# Patient Record
Sex: MALE | Race: WHITE | NOT HISPANIC OR LATINO | Employment: UNEMPLOYED | URBAN - METROPOLITAN AREA
[De-identification: names, ages, dates, MRNs, and addresses within clinical notes are randomized per-mention and may not be internally consistent; named-entity substitution may affect disease eponyms.]

---

## 2021-12-20 ENCOUNTER — OFFICE VISIT (OUTPATIENT)
Dept: INTERNAL MEDICINE | Facility: CLINIC | Age: 36
End: 2021-12-20

## 2021-12-20 VITALS
DIASTOLIC BLOOD PRESSURE: 78 MMHG | BODY MASS INDEX: 31.69 KG/M2 | WEIGHT: 226.4 LBS | TEMPERATURE: 97.8 F | HEART RATE: 97 BPM | SYSTOLIC BLOOD PRESSURE: 102 MMHG | HEIGHT: 71 IN

## 2021-12-20 DIAGNOSIS — F32.A ANXIETY AND DEPRESSION: ICD-10-CM

## 2021-12-20 DIAGNOSIS — Z13.220 LIPID SCREENING: Primary | ICD-10-CM

## 2021-12-20 DIAGNOSIS — F41.9 ANXIETY AND DEPRESSION: ICD-10-CM

## 2021-12-20 DIAGNOSIS — F90.0 ADHD, PREDOMINANTLY INATTENTIVE TYPE: ICD-10-CM

## 2021-12-20 DIAGNOSIS — F10.21 HISTORY OF ALCOHOLISM (HCC): ICD-10-CM

## 2021-12-20 DIAGNOSIS — Z13.29 THYROID DISORDER SCREENING: ICD-10-CM

## 2021-12-20 DIAGNOSIS — Z13.21 ENCOUNTER FOR VITAMIN DEFICIENCY SCREENING: ICD-10-CM

## 2021-12-20 DIAGNOSIS — R74.8 ELEVATED LIVER ENZYMES: ICD-10-CM

## 2021-12-20 DIAGNOSIS — F51.01 PRIMARY INSOMNIA: ICD-10-CM

## 2021-12-20 PROCEDURE — 99204 OFFICE O/P NEW MOD 45 MIN: CPT | Performed by: PHYSICIAN ASSISTANT

## 2021-12-20 NOTE — PROGRESS NOTES
Patient Care Team:  Maria Elena Chawla PA-C as PCP - General (Physician Assistant)  Chelo Wen APRN    Chief Complaint:: No chief complaint on file.       Subjective     HPI  Diego is a 36 year old male who presents to [facility].  Born and raised in [place].  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  Last job was [employer].  He is currently unemployed.  He is  to nurse practitioner working in women's health.  Mother and Father living-HTN, early 70s  Uncle-stroke  Sister-depression/anxiety  Pfizer vaccinations x 2-will bring card at next appointment.    Has struggled with anxiety/depression since he was 18 or 19 years old.  Reports increase in depression the past for 5 years.  Was seeing nurse practitioner Wilma More for medication management.  Now seeing Dr. Richter.    Has had increase in drinking over the past 2-3 years. Started right before COVID, and then worsened during the pandemic.  He ended up in the ED ([place]) [place] 6 weeks ago and this was his wake up call. He has been sober every since. Has lost 15-16 pounds since he stopped drinking.  He has been exercising regularly.  He has tried to eat healthy.   He does not go to AA meetings, stays with parents when his wife is at work.    Diagnosed with ADHD inattentive type.  Currently taking Adderall XL 15 mg in the morning and Adderall 5 mg in the afternoon.  He reports this helps him with focus.    Prescribed trazodone 50 mg for sleep.  However, he rarely takes this.  Currently taking Lamictal 100 mg nightly, but psychiatrist might wean him off of this.  He has recently switched from Viibryd to Pristiq approximately 4 weeks ago.  He reports improvement of symptoms.    He denies chest pain, shortness of breath, palpitations, or headaches.      The  "following portions of the patient's history were reviewed and updated as appropriate: active problem list, medication list, allergies, family history, social history    Review of Systems:   Review of Systems   Constitutional: Negative for activity change, appetite change, diaphoresis, fatigue, unexpected weight gain and unexpected weight loss.   HENT: Negative for congestion, dental problem, drooling, ear discharge, ear pain, hearing loss, rhinorrhea, sinus pressure, sore throat, swollen glands and voice change.    Eyes: Negative for blurred vision, double vision and visual disturbance.   Respiratory: Negative for chest tightness and shortness of breath.    Cardiovascular: Negative for chest pain, palpitations and leg swelling.   Gastrointestinal: Negative for abdominal pain, blood in stool, GERD and indigestion.   Endocrine: Negative for cold intolerance and heat intolerance.   Genitourinary: Negative for dysuria and hematuria.   Musculoskeletal: Negative for arthralgias, back pain and myalgias.   Skin: Negative for skin lesions.   Allergic/Immunologic: Negative for environmental allergies and food allergies.   Neurological: Negative for tremors, seizures, syncope, speech difficulty, weakness, headache, memory problem and confusion.   Hematological: Does not bruise/bleed easily.   Psychiatric/Behavioral: Positive for decreased concentration, sleep disturbance, depressed mood and stress. The patient is not nervous/anxious.        Vital Signs  Vitals:    12/20/21 1347   BP: 102/78   BP Location: Left arm   Patient Position: Sitting   Cuff Size: Adult   Pulse: 97   Temp: 97.8 °F (36.6 °C)   TempSrc: Temporal   Weight: 103 kg (226 lb 6.4 oz)   Height: 181 cm (71.26\")   PainSc: 0-No pain     Body mass index is 31.35 kg/m².    Labs  No visits with results within 3 Month(s) from this visit.   Latest known visit with results is:   No results found for any previous visit.       Imaging  No radiology results for the last 30 " days.      Current Outpatient Medications:   •  amphetamine-dextroamphetamine (ADDERALL) 5 MG tablet, Take 1 tablet by mouth Daily at noon., Disp: 30 tablet, Rfl: 0  •  desvenlafaxine (PRISTIQ) 100 MG 24 hr tablet, Take 1 tablet by mouth Every Morning., Disp: 30 tablet, Rfl: 1  •  diazePAM (VALIUM) 5 MG tablet, Take 1.5 tablets by mouth Every Evening., Disp: 45 tablet, Rfl: 0  •  lamoTRIgine (LaMICtal) 100 MG tablet, Take 1 tablet by mouth Every Morning., Disp: 90 tablet, Rfl: 0  •  propranolol (INDERAL) 10 MG tablet, Take 1-2 tablets by mouth 3 (Three) Times a Day. (Patient taking differently: Take 10-20 mg by mouth Every 30 (Thirty) Days.), Disp: 540 tablet, Rfl: 2  •  traZODone (DESYREL) 50 MG tablet, Take 0.5-1 tablets by mouth every night at bedtime., Disp: 90 tablet, Rfl: 0    Physical Exam:    Physical Exam  Vitals reviewed.   Constitutional:       Appearance: Normal appearance. He is well-developed and normal weight.   HENT:      Head: Normocephalic and atraumatic.      Right Ear: Hearing, tympanic membrane, ear canal and external ear normal.      Left Ear: Hearing, tympanic membrane, ear canal and external ear normal.      Nose: Nose normal.      Mouth/Throat:      Mouth: Mucous membranes are moist.      Pharynx: Oropharynx is clear. Uvula midline.   Eyes:      General: Lids are normal.      Conjunctiva/sclera: Conjunctivae normal.      Pupils: Pupils are equal, round, and reactive to light.   Cardiovascular:      Rate and Rhythm: Normal rate and regular rhythm.      Pulses: Normal pulses.      Heart sounds: Normal heart sounds.   Pulmonary:      Effort: Pulmonary effort is normal.      Breath sounds: Normal breath sounds.   Abdominal:      General: Abdomen is flat. Bowel sounds are normal.      Palpations: Abdomen is soft.      Tenderness: There is no right CVA tenderness or left CVA tenderness.   Musculoskeletal:         General: Normal range of motion.      Cervical back: Full passive range of motion  without pain, normal range of motion and neck supple.   Skin:     General: Skin is warm and dry.   Neurological:      General: No focal deficit present.      Mental Status: He is alert and oriented to person, place, and time. Mental status is at baseline.      Deep Tendon Reflexes: Reflexes are normal and symmetric.   Psychiatric:         Mood and Affect: Mood normal.         Speech: Speech normal.         Behavior: Behavior normal.         Thought Content: Thought content normal.         Judgment: Judgment normal.         Procedures        Assessment/Plan   Problem List Items Addressed This Visit        Cardiac and Vasculature    Lipid screening - Primary    Relevant Orders    Lipid Panel       Endocrine and Metabolic    Thyroid disorder screening    Relevant Orders    TSH    T4, Free    T3, Free    Encounter for vitamin deficiency screening    Relevant Orders    Vitamin D 25 Hydroxy    Vitamin B12       Gastrointestinal Abdominal     Elevated liver enzymes    Overview     Elevated liver enzymes noted while heavy drinking.  Check labs today.         Relevant Orders    CBC & Differential    Comprehensive Metabolic Panel       Mental Health    History of alcoholism (HCC)    Overview     Was treated in [place] emergency department approximately 6 weeks ago.  Will request for records.  Has been sober ever since.  Has support with parents and spouse.  He reports he will pursue AA meetings after January.           Relevant Orders    Folate    ADHD, predominantly inattentive type    Overview     Now managed by Dr. Richter.  Currently taking Adderall XL 15 mg every morning and Adderall 5 mg in the afternoon.         Relevant Medications    traZODone (DESYREL) 50 MG tablet    desvenlafaxine (PRISTIQ) 100 MG 24 hr tablet    diazePAM (VALIUM) 5 MG tablet    amphetamine-dextroamphetamine (ADDERALL) 5 MG tablet    Anxiety and depression    Overview     Currently treated with desvenlafaxine 100 mg tablets daily.  Continue  Valium 1-1/2 pills daily.  Continue lamotrigine 100 mg tablets at night.  He rarely takes propranolol 10 mg tablets for anxiety.         Relevant Medications    traZODone (DESYREL) 50 MG tablet    desvenlafaxine (PRISTIQ) 100 MG 24 hr tablet    diazePAM (VALIUM) 5 MG tablet    amphetamine-dextroamphetamine (ADDERALL) 5 MG tablet       Sleep    Primary insomnia    Overview     Previous prescription of trazodone 50 mg tablets.  He reports using these very rarely, approximately once a month.               Return in about 6 months (around 6/20/2022) for RECHECK 30MIN, ROUTINE PHYSICAL.    Plan of care reviewed with patient at the conclusion of today's visit. Education was provided regarding diagnosis, management, and any prescribed or recommended OTC medications.Patient verbalizes understanding of and agreement with management plan.     I spent 36 minutes caring for Diego on this date of service. This time includes time spent by me in the following activities:preparing for the visit, reviewing tests, obtaining and/or reviewing a separately obtained history, performing a medically appropriate examination and/or evaluation , counseling and educating the patient/family/caregiver, ordering medications, tests, or procedures, referring and communicating with other health care professionals  and documenting information in the medical record    Maria Elena Chawla PA-C    Please note that portions of this note were completed with a voice recognition program.

## 2021-12-29 ENCOUNTER — PATIENT ROUNDING (BHMG ONLY) (OUTPATIENT)
Dept: INTERNAL MEDICINE | Facility: CLINIC | Age: 36
End: 2021-12-29

## 2021-12-29 NOTE — PROGRESS NOTES
Left a voicemail with the following message:    My name is Rosana Catherine & I’m the practice manager here at [facility] with Maria Elena Chawla's office.    I’m calling as I see you were a new patient with our practice last week & I wanted to officially welcome you & make sure everything went smoothly for you.    If you have any questions, comments or concerns please feel free to reach out to me.    Otherwise again welcome & we look forward to seeing you back in about 6 months for your physical.  If you need anything in the meantime just let us know.    I hope you have a Happy new year.

## 2022-06-16 ENCOUNTER — TELEPHONE (OUTPATIENT)
Dept: INTERNAL MEDICINE | Facility: CLINIC | Age: 37
End: 2022-06-16

## 2022-06-16 NOTE — TELEPHONE ENCOUNTER
Caller: Diego Lloyd    Relationship: Self    Best call back number: [phone]    What orders are you requesting (i.e. lab or imaging): BLOOD WORK     In what timeframe would the patient need to come in: N/A    Where will you receive your lab/imaging services: N/A    Additional notes: PATIENT STATED THAT HE WOULD LIKE TO HAVE HIS BLOOD WORK PRIOR TO APPOINTMENT ON 06/21/22    PLEASE ADVISE

## 2022-06-20 DIAGNOSIS — Z13.29 THYROID DISORDER SCREENING: Primary | ICD-10-CM

## 2022-06-20 DIAGNOSIS — Z13.220 LIPID SCREENING: ICD-10-CM

## 2022-06-20 DIAGNOSIS — Z13.21 ENCOUNTER FOR VITAMIN DEFICIENCY SCREENING: ICD-10-CM

## 2022-06-20 DIAGNOSIS — R74.8 ELEVATED LIVER ENZYMES: ICD-10-CM

## 2022-06-21 ENCOUNTER — LAB (OUTPATIENT)
Dept: LAB | Facility: HOSPITAL | Age: 37
End: 2022-06-21

## 2022-06-21 ENCOUNTER — OFFICE VISIT (OUTPATIENT)
Dept: INTERNAL MEDICINE | Facility: CLINIC | Age: 37
End: 2022-06-21

## 2022-06-21 VITALS
BODY MASS INDEX: 29.76 KG/M2 | WEIGHT: 212.6 LBS | SYSTOLIC BLOOD PRESSURE: 106 MMHG | HEART RATE: 88 BPM | HEIGHT: 71 IN | DIASTOLIC BLOOD PRESSURE: 80 MMHG | TEMPERATURE: 97.8 F

## 2022-06-21 DIAGNOSIS — F51.01 PRIMARY INSOMNIA: ICD-10-CM

## 2022-06-21 DIAGNOSIS — H61.22 HEARING LOSS OF LEFT EAR DUE TO CERUMEN IMPACTION: ICD-10-CM

## 2022-06-21 DIAGNOSIS — F90.0 ADHD, PREDOMINANTLY INATTENTIVE TYPE: ICD-10-CM

## 2022-06-21 DIAGNOSIS — F41.9 ANXIETY AND DEPRESSION: ICD-10-CM

## 2022-06-21 DIAGNOSIS — Z13.220 LIPID SCREENING: ICD-10-CM

## 2022-06-21 DIAGNOSIS — F10.21 HISTORY OF ALCOHOLISM: ICD-10-CM

## 2022-06-21 DIAGNOSIS — F32.A ANXIETY AND DEPRESSION: ICD-10-CM

## 2022-06-21 DIAGNOSIS — R74.8 ELEVATED LIVER ENZYMES: ICD-10-CM

## 2022-06-21 DIAGNOSIS — Z13.29 THYROID DISORDER SCREENING: ICD-10-CM

## 2022-06-21 DIAGNOSIS — Z00.00 ROUTINE MEDICAL EXAM: Primary | ICD-10-CM

## 2022-06-21 DIAGNOSIS — Z13.21 ENCOUNTER FOR VITAMIN DEFICIENCY SCREENING: ICD-10-CM

## 2022-06-21 LAB
25(OH)D3 SERPL-MCNC: 30.6 NG/ML (ref 30–100)
ALBUMIN SERPL-MCNC: 4.7 G/DL (ref 3.5–5.2)
ALBUMIN/GLOB SERPL: 1.9 G/DL
ALP SERPL-CCNC: 62 U/L (ref 39–117)
ALT SERPL W P-5'-P-CCNC: 19 U/L (ref 1–41)
ANION GAP SERPL CALCULATED.3IONS-SCNC: 17.1 MMOL/L (ref 5–15)
AST SERPL-CCNC: 20 U/L (ref 1–40)
BASOPHILS # BLD AUTO: 0.06 10*3/MM3 (ref 0–0.2)
BASOPHILS NFR BLD AUTO: 0.8 % (ref 0–1.5)
BILIRUB SERPL-MCNC: 0.3 MG/DL (ref 0–1.2)
BUN SERPL-MCNC: 3 MG/DL (ref 6–20)
BUN/CREAT SERPL: 3.3 (ref 7–25)
CALCIUM SPEC-SCNC: 9.8 MG/DL (ref 8.6–10.5)
CHLORIDE SERPL-SCNC: 97 MMOL/L (ref 98–107)
CHOLEST SERPL-MCNC: 140 MG/DL (ref 0–200)
CO2 SERPL-SCNC: 24.9 MMOL/L (ref 22–29)
CREAT SERPL-MCNC: 0.92 MG/DL (ref 0.76–1.27)
DEPRECATED RDW RBC AUTO: 38.6 FL (ref 37–54)
EGFRCR SERPLBLD CKD-EPI 2021: 110.6 ML/MIN/1.73
EOSINOPHIL # BLD AUTO: 0.07 10*3/MM3 (ref 0–0.4)
EOSINOPHIL NFR BLD AUTO: 0.9 % (ref 0.3–6.2)
ERYTHROCYTE [DISTWIDTH] IN BLOOD BY AUTOMATED COUNT: 11.4 % (ref 12.3–15.4)
GLOBULIN UR ELPH-MCNC: 2.5 GM/DL
GLUCOSE SERPL-MCNC: 75 MG/DL (ref 65–99)
HCT VFR BLD AUTO: 44.6 % (ref 37.5–51)
HDLC SERPL-MCNC: 43 MG/DL (ref 40–60)
HGB BLD-MCNC: 15.3 G/DL (ref 13–17.7)
IMM GRANULOCYTES # BLD AUTO: 0.05 10*3/MM3 (ref 0–0.05)
IMM GRANULOCYTES NFR BLD AUTO: 0.7 % (ref 0–0.5)
LDLC SERPL CALC-MCNC: 75 MG/DL (ref 0–100)
LDLC/HDLC SERPL: 1.67 {RATIO}
LYMPHOCYTES # BLD AUTO: 1.98 10*3/MM3 (ref 0.7–3.1)
LYMPHOCYTES NFR BLD AUTO: 26.7 % (ref 19.6–45.3)
MCH RBC QN AUTO: 32.1 PG (ref 26.6–33)
MCHC RBC AUTO-ENTMCNC: 34.3 G/DL (ref 31.5–35.7)
MCV RBC AUTO: 93.7 FL (ref 79–97)
MONOCYTES # BLD AUTO: 0.58 10*3/MM3 (ref 0.1–0.9)
MONOCYTES NFR BLD AUTO: 7.8 % (ref 5–12)
NEUTROPHILS NFR BLD AUTO: 4.67 10*3/MM3 (ref 1.7–7)
NEUTROPHILS NFR BLD AUTO: 63.1 % (ref 42.7–76)
NRBC BLD AUTO-RTO: 0 /100 WBC (ref 0–0.2)
PLATELET # BLD AUTO: 337 10*3/MM3 (ref 140–450)
PMV BLD AUTO: 9.7 FL (ref 6–12)
POTASSIUM SERPL-SCNC: 4.3 MMOL/L (ref 3.5–5.2)
PROT SERPL-MCNC: 7.2 G/DL (ref 6–8.5)
RBC # BLD AUTO: 4.76 10*6/MM3 (ref 4.14–5.8)
SODIUM SERPL-SCNC: 139 MMOL/L (ref 136–145)
T4 FREE SERPL-MCNC: 0.94 NG/DL (ref 0.93–1.7)
TRIGL SERPL-MCNC: 125 MG/DL (ref 0–150)
TSH SERPL DL<=0.05 MIU/L-ACNC: 2.32 UIU/ML (ref 0.27–4.2)
VIT B12 BLD-MCNC: 386 PG/ML (ref 211–946)
VLDLC SERPL-MCNC: 22 MG/DL (ref 5–40)
WBC NRBC COR # BLD: 7.41 10*3/MM3 (ref 3.4–10.8)

## 2022-06-21 PROCEDURE — 82306 VITAMIN D 25 HYDROXY: CPT

## 2022-06-21 PROCEDURE — 69210 REMOVE IMPACTED EAR WAX UNI: CPT | Performed by: PHYSICIAN ASSISTANT

## 2022-06-21 PROCEDURE — 80061 LIPID PANEL: CPT

## 2022-06-21 PROCEDURE — 99395 PREV VISIT EST AGE 18-39: CPT | Performed by: PHYSICIAN ASSISTANT

## 2022-06-21 PROCEDURE — 82607 VITAMIN B-12: CPT

## 2022-06-21 PROCEDURE — 84439 ASSAY OF FREE THYROXINE: CPT

## 2022-06-21 PROCEDURE — 80050 GENERAL HEALTH PANEL: CPT

## 2022-06-21 NOTE — PROGRESS NOTES
[facility]    Diego Lloyd  1985   [id number]      Patient Care Team:  Maria Elena Chawla PA-C as PCP - General (Physician Assistant)  Chelo Wen APRN    Chief Complaint::   Chief Complaint   Patient presents with   • Annual Exam        HPI    Annual exam.    The patient sees Dr. Richter. He is currently taking Buspar 15 mg twice daily. He has also been on Pristiq 100 mg since 12/2021. He takes lamotrigine in the mornings. The patient wants to discuss weaning off the lamotrigine, he believes he was given the medication for bipolar disorder but is not bipolar. He reports that he has never stopped taking the medication to see if he felt better. He states that he took Lexapro for many years then stopped taking it and began to experience brain zaps even up to a year later.     Health maintenance.  The patient reports being sober now for 7 months. He denies going to AA meetings. He sees a counselor. He has cut down from weekly meetings to every other week. His wife and parents are very supportive. On the last visit, he was living with his parents. He had given up his car keys and cash. He has since then been able to get it all back. He lived with his parents for approximately 6 weeks. The patient reports that he is sleeping really well, has not needed anything to help him sleep. He states he takes propranolol as needed. He denies chest pain, shortness of breath, palpitations, or stomach pains. He had his last eye exam approximately 6 months ago, he goes yearly. He does wear contacts. He has an appointment to see the dentist next week. The patient reports that he has had 2 COVID-19 vaccines and a COVID-19 booster.  He believes he is up-to-date on TDAP vaccine. He reports having seasonal allergies, with a runny nose occasionally. He denies having any back pain. He denies constipation or diarrhea.     Ear cerumen impaction.  The patient reports needing to use Debrox ear wax  drops. He is not sure if he applied the drops correctly since there was not much cerebrum that came out. He reports occasionally getting water in his ear from the shower. He will feel like he needs to shake his head all day.     Social.  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 He is looking forward to a big exam coming up.     Patient Active Problem List   Diagnosis   • Lipid screening   • Elevated liver enzymes   • Thyroid disorder screening   • Encounter for vitamin deficiency screening   • History of alcoholism (HCC)   • ADHD, predominantly inattentive type   • Anxiety and depression   • Primary insomnia   • Hearing loss of left ear due to cerumen impaction   • Routine medical exam        Past Medical History:   Diagnosis Date   • Anxiety    • Depression        No past surgical history on file.    Family History   Problem Relation Age of Onset   • Hypertension Mother    • Hypertension Father    • Stroke Paternal Uncle    • Cancer Maternal Grandmother    • Cancer Paternal Grandfather        Social History     Socioeconomic History   • Marital status:    Tobacco Use   • Smoking status: Never Smoker   • Smokeless tobacco: Never Used   Substance and Sexual Activity   • Alcohol use: Not Currently     Comment: stopped 11/11/21   • Drug use: Never   • Sexual activity: Defer       No Known Allergies    Review of Systems   Constitutional: Negative for activity change, appetite change, diaphoresis, fatigue, unexpected weight gain and unexpected weight loss.   HENT: Positive for hearing loss. Negative for congestion, ear discharge, ear pain, facial swelling, sore throat and swollen glands.    Eyes: Negative for blurred vision, double vision and visual disturbance.   Respiratory: Negative for chest tightness and shortness of breath.    Cardiovascular: Negative for chest pain, palpitations and leg swelling.   Gastrointestinal: Negative for abdominal distention,  "abdominal pain, blood in stool, GERD and indigestion.   Endocrine: Negative for cold intolerance and heat intolerance.   Genitourinary: Negative for dysuria and hematuria.   Musculoskeletal: Negative for arthralgias and myalgias.   Skin: Negative for skin lesions.   Neurological: Negative for tremors, seizures, syncope, speech difficulty, weakness, headache, memory problem and confusion.   Hematological: Does not bruise/bleed easily.   Psychiatric/Behavioral: Negative for sleep disturbance and depressed mood. The patient is not nervous/anxious.         Vital Signs  Vitals:    06/21/22 0827   BP: 106/80   BP Location: Left arm   Patient Position: Sitting   Cuff Size: Large Adult   Pulse: 88   Temp: 97.8 °F (36.6 °C)   Weight: 96.4 kg (212 lb 9.6 oz)   Height: 181 cm (71.25\")   PainSc: 0-No pain     Body mass index is 29.44 kg/m².  BMI is >= 25 and <30. (Overweight) The following options were offered after discussion;: weight loss educational material (shared in after visit summary), exercise counseling/recommendations and nutrition counseling/recommendations         Current Outpatient Medications:   •  busPIRone (BUSPAR) 15 MG tablet, Take 2 tablets by mouth 2 (Two) Times a Day., Disp: 360 tablet, Rfl: 2  •  desvenlafaxine (PRISTIQ) 100 MG 24 hr tablet, Take 1 tablet by mouth Every Morning., Disp: 90 tablet, Rfl: 2  •  lamoTRIgine (LaMICtal) 100 MG tablet, Take 1 tablet by mouth Every Morning., Disp: 90 tablet, Rfl: 1  •  propranolol (INDERAL) 20 MG tablet, Take 1 tablet by mouth as needed for performance anxiety, Disp: 50 tablet, Rfl: 5  •  busPIRone (BUSPAR) 15 MG tablet, Take 0.5 tablets by mouth 2 (Two) Times a Day for 7 days, THEN 1 tablet 2 (Two) Times a Day thereafter, Disp: 60 tablet, Rfl: 1    Physical Exam  Vitals reviewed.   Constitutional:       Appearance: Normal appearance. He is well-developed.   HENT:      Head: Normocephalic and atraumatic.      Right Ear: Hearing, tympanic membrane, ear canal and " external ear normal.      Left Ear: Hearing, ear canal and external ear normal. There is impacted cerumen.      Nose: Nose normal.      Mouth/Throat:      Mouth: Mucous membranes are moist.      Pharynx: Oropharynx is clear. Uvula midline. No posterior oropharyngeal erythema.   Eyes:      General: Lids are normal.      Conjunctiva/sclera: Conjunctivae normal.      Pupils: Pupils are equal, round, and reactive to light.   Cardiovascular:      Rate and Rhythm: Normal rate and regular rhythm.      Heart sounds: Normal heart sounds.   Pulmonary:      Effort: Pulmonary effort is normal.      Breath sounds: Normal breath sounds.   Abdominal:      General: Bowel sounds are normal.      Palpations: Abdomen is soft.      Tenderness: There is no right CVA tenderness or left CVA tenderness.   Musculoskeletal:         General: Normal range of motion.      Cervical back: Full passive range of motion without pain, normal range of motion and neck supple.   Skin:     General: Skin is warm and dry.   Neurological:      General: No focal deficit present.      Mental Status: He is alert and oriented to person, place, and time. Mental status is at baseline.      Deep Tendon Reflexes: Reflexes are normal and symmetric.   Psychiatric:         Speech: Speech normal.         Behavior: Behavior normal.         Thought Content: Thought content normal.         Judgment: Judgment normal.          ACE III MINI            Results Review:    Recent Results (from the past 672 hour(s))   Comprehensive Metabolic Panel    Collection Time: 06/21/22  9:28 AM    Specimen: Blood   Result Value Ref Range    Glucose 75 65 - 99 mg/dL    BUN 3 (L) 6 - 20 mg/dL    Creatinine 0.92 0.76 - 1.27 mg/dL    Sodium 139 136 - 145 mmol/L    Potassium 4.3 3.5 - 5.2 mmol/L    Chloride 97 (L) 98 - 107 mmol/L    CO2 24.9 22.0 - 29.0 mmol/L    Calcium 9.8 8.6 - 10.5 mg/dL    Total Protein 7.2 6.0 - 8.5 g/dL    Albumin 4.70 3.50 - 5.20 g/dL    ALT (SGPT) 19 1 - 41 U/L    AST  (SGOT) 20 1 - 40 U/L    Alkaline Phosphatase 62 39 - 117 U/L    Total Bilirubin 0.3 0.0 - 1.2 mg/dL    Globulin 2.5 gm/dL    A/G Ratio 1.9 g/dL    BUN/Creatinine Ratio 3.3 (L) 7.0 - 25.0    Anion Gap 17.1 (H) 5.0 - 15.0 mmol/L    eGFR 110.6 >60.0 mL/min/1.73   Lipid Panel    Collection Time: 06/21/22  9:28 AM    Specimen: Blood   Result Value Ref Range    Total Cholesterol 140 0 - 200 mg/dL    Triglycerides 125 0 - 150 mg/dL    HDL Cholesterol 43 40 - 60 mg/dL    LDL Cholesterol  75 0 - 100 mg/dL    VLDL Cholesterol 22 5 - 40 mg/dL    LDL/HDL Ratio 1.67    TSH    Collection Time: 06/21/22  9:28 AM    Specimen: Blood   Result Value Ref Range    TSH 2.320 0.270 - 4.200 uIU/mL   T4, Free    Collection Time: 06/21/22  9:28 AM    Specimen: Blood   Result Value Ref Range    Free T4 0.94 0.93 - 1.70 ng/dL   Vitamin B12    Collection Time: 06/21/22  9:28 AM    Specimen: Blood   Result Value Ref Range    Vitamin B-12 386 211 - 946 pg/mL   Vitamin D 25 Hydroxy    Collection Time: 06/21/22  9:28 AM    Specimen: Blood   Result Value Ref Range    25 Hydroxy, Vitamin D 30.6 30.0 - 100.0 ng/ml   CBC Auto Differential    Collection Time: 06/21/22  9:28 AM    Specimen: Blood   Result Value Ref Range    WBC 7.41 3.40 - 10.80 10*3/mm3    RBC 4.76 4.14 - 5.80 10*6/mm3    Hemoglobin 15.3 13.0 - 17.7 g/dL    Hematocrit 44.6 37.5 - 51.0 %    MCV 93.7 79.0 - 97.0 fL    MCH 32.1 26.6 - 33.0 pg    MCHC 34.3 31.5 - 35.7 g/dL    RDW 11.4 (L) 12.3 - 15.4 %    RDW-SD 38.6 37.0 - 54.0 fl    MPV 9.7 6.0 - 12.0 fL    Platelets 337 140 - 450 10*3/mm3    Neutrophil % 63.1 42.7 - 76.0 %    Lymphocyte % 26.7 19.6 - 45.3 %    Monocyte % 7.8 5.0 - 12.0 %    Eosinophil % 0.9 0.3 - 6.2 %    Basophil % 0.8 0.0 - 1.5 %    Immature Grans % 0.7 (H) 0.0 - 0.5 %    Neutrophils, Absolute 4.67 1.70 - 7.00 10*3/mm3    Lymphocytes, Absolute 1.98 0.70 - 3.10 10*3/mm3    Monocytes, Absolute 0.58 0.10 - 0.90 10*3/mm3    Eosinophils, Absolute 0.07 0.00 - 0.40 10*3/mm3     Basophils, Absolute 0.06 0.00 - 0.20 10*3/mm3    Immature Grans, Absolute 0.05 0.00 - 0.05 10*3/mm3    nRBC 0.0 0.0 - 0.2 /100 WBC     Ear Cerumen Removal    Date/Time: 6/21/2022 9:09 AM  Performed by: Maria Elena Chawla PA-C  Authorized by: Maria Elena Chawla PA-C     Anesthesia:  Local Anesthetic: none  Location details: left ear  Patient tolerance: patient tolerated the procedure well with no immediate complications  Procedure type: instrumentation, curette   Sedation:  Patient sedated: no            Medication Review: Medications reviewed and noted    Social History     Socioeconomic History   • Marital status:    Tobacco Use   • Smoking status: Never Smoker   • Smokeless tobacco: Never Used   Substance and Sexual Activity   • Alcohol use: Not Currently     Comment: stopped 11/11/21   • Drug use: Never   • Sexual activity: Defer        Assessment/Plan:    Problem List Items Addressed This Visit        ENT    Hearing loss of left ear due to cerumen impaction    Relevant Orders    Cerumen Removal       Health Encounters    Routine medical exam - Primary    Overview     He is current on immunizations-will upload COVID vaccination.  Fasting labs today.              Mental Health    History of alcoholism (HCC)    Overview     Sees counselor every other week.  Wife supportive, parents also supportive.              ADHD, predominantly inattentive type    Overview     Now managed by Dr. Richter.  Currently taking Adderall XL 15 mg every morning and Adderall 5 mg in the afternoon.           Relevant Medications    desvenlafaxine (PRISTIQ) 100 MG 24 hr tablet    busPIRone (BUSPAR) 15 MG tablet    Anxiety and depression    Overview     Currently treated with desvenlafaxine 100 mg tablets daily. Continue lamotrigine 100 mg tablets in the morning.  He rarely takes propranolol 10 mg tablets for anxiety.           Relevant Medications    desvenlafaxine (PRISTIQ) 100 MG 24 hr tablet    busPIRone (BUSPAR) 15 MG  tablet       Sleep    Primary insomnia    Overview     Has sleeping well, no issues.                  There are no Patient Instructions on file for this visit.     Plan of care reviewed with patient at the conclusion of today's visit. Education was provided regarding diagnosis, management, and any prescribed or recommended OTC medications.Patient verbalizes understanding of and agreement with management plan.       I spent 20 minutes caring for Diego on this date of service. This time includes time spent by me in the following activities:preparing for the visit, reviewing tests, obtaining and/or reviewing a separately obtained history, performing a medically appropriate examination and/or evaluation , counseling and educating the patient/family/caregiver, ordering medications, tests, or procedures and documenting information in the medical record    Maria Elena Chawla PA-C      Note: Part of this note may be an electronic transcription/translation of spoken language to printed text using the Dragon Dictation system.  Transcribed from ambient dictation for Maria Elena Chawla PA-C by Cindy Aldana.  06/21/22   11:13 EDT    Patient verbalized consent to the visit recording.

## 2022-06-21 NOTE — PROGRESS NOTES
"QUICK REFERENCE INFORMATION:  The ABCs of the Annual Wellness Visit    Annual Wellness visit    HEALTH RISK ASSESSMENT    1985    Recent History  {Hospitalization history:[id number]::\"No hospitalization(s) within the last year.\"}.        Current Medical Providers:  Patient Care Team:  Maria Elena Chawla PA-C as PCP - General (Physician Assistant)  Chelo Wen APRN        Smoking Status:  Social History     Tobacco Use   Smoking Status Never Smoker   Smokeless Tobacco Never Used       Alcohol Consumption:  Social History     Substance and Sexual Activity   Alcohol Use Not Currently    Comment: stopped 11/11/21       Depression Screen:   PHQ-2/PHQ-9 Depression Screening 12/20/2021   Retired PHQ-9 Total Score 0   Retired Total Score 0       Health Habits:              Recent Lab Results:  CMP:     Lipid Panel:     HbA1c:           Age-appropriate Screening Schedule:  Refer to the list below for future screening recommendations based on patient's age, sex and/or medical conditions. Orders for these recommended tests are listed in the plan section. The patient has been provided with a written plan.    Health Maintenance   Topic Date Due   • TDAP/TD VACCINES (1 - Tdap) Never done   • INFLUENZA VACCINE  10/01/2022        Subjective   History of Present Illness    Diego Lloyd is a 36 y.o. male who presents for an Annual Wellness Visit.    The following portions of the patient's history were reviewed and updated as appropriate: {history reviewed:20406::\"allergies\",\"current medications\",\"past family history\",\"past medical history\",\"past social history\",\"past surgical history\",\"problem list\"}.    Outpatient Medications Prior to Visit   Medication Sig Dispense Refill   • amphetamine-dextroamphetamine (ADDERALL) 5 MG tablet Take 1 tablet by mouth Daily at noon. 30 tablet 0   • amphetamine-dextroamphetamine (ADDERALL) 5 MG tablet Take 1 tablet by mouth in the afternoon, as booster 30 tablet 0   • " amphetamine-dextroamphetamine (ADDERALL) 5 MG tablet Take one tablet by mouth daily at noon 30 tablet 0   • amphetamine-dextroamphetamine (ADDERALL) 5 MG tablet Take 1 tablet by mouth Daily at noon 30 tablet 0   • amphetamine-dextroamphetamine (ADDERALL) 5 MG tablet Take 1 tablet by mouth Daily With Lunch. 30 tablet 0   • amphetamine-dextroamphetamine XR (ADDERALL XR) 15 MG 24 hr capsule Take 1 capsule by mouth Every Morning. 30 capsule 0   • amphetamine-dextroamphetamine XR (ADDERALL XR) 15 MG 24 hr capsule Take 1 capsule by mouth Every Morning 30 capsule 0   • amphetamine-dextroamphetamine XR (ADDERALL XR) 15 MG 24 hr capsule Take 1 capsule by mouth Every Morning 30 capsule 0   • amphetamine-dextroamphetamine XR (ADDERALL XR) 15 MG 24 hr capsule Take one (1) capsule by mouth every morning 30 capsule 0   • amphetamine-dextroamphetamine XR (ADDERALL XR) 15 MG 24 hr capsule Take 1 capsule by mouth Every Morning 30 capsule 0   • busPIRone (BUSPAR) 15 MG tablet Take 0.5 tablets by mouth 2 (Two) Times a Day for 7 days, THEN 1 tablet 2 (Two) Times a Day thereafter 60 tablet 1   • busPIRone (BUSPAR) 15 MG tablet Take 2 tablets by mouth 2 (Two) Times a Day, or as directed. 360 tablet 1   • busPIRone (BUSPAR) 15 MG tablet Take 2 tablets by mouth 2 (Two) Times a Day. 360 tablet 2   • desvenlafaxine (PRISTIQ) 100 MG 24 hr tablet Take 1 tablet by mouth Every Morning. 30 tablet 1   • desvenlafaxine (PRISTIQ) 100 MG 24 hr tablet Take 1 tablet by mouth Every Morning. 90 tablet 1   • desvenlafaxine (PRISTIQ) 100 MG 24 hr tablet Take 1 tablet by mouth Every Morning. 90 tablet 2   • diazePAM (VALIUM) 5 MG tablet Take 1.5 tablets by mouth Every Evening. 45 tablet 5   • lamoTRIgine (LaMICtal) 100 MG tablet Take 1 tablet by mouth Every Morning. 90 tablet 1   • propranolol (INDERAL) 20 MG tablet Take 1 tablet by mouth Daily As Needed for anxiety 30 tablet 1   • propranolol (INDERAL) 20 MG tablet Take 1 tablet by mouth as needed for  performance anxiety 50 tablet 5   • traZODone (DESYREL) 50 MG tablet Take 0.5-1 tablets by mouth every night at bedtime. 90 tablet 0     No facility-administered medications prior to visit.       Patient Active Problem List   Diagnosis   • Lipid screening   • Elevated liver enzymes   • Thyroid disorder screening   • Encounter for vitamin deficiency screening   • History of alcoholism (HCC)   • ADHD, predominantly inattentive type   • Anxiety and depression   • Primary insomnia       Advance Care Planning:  {Advance Directive Status:71473}    Identification of Risk Factors:  Risk factors include: {; WELLNESS RISK FACTORS:99381}.    Review of Systems    Compared to one year ago, the patient feels his physical health is {better worse same:17783}.  Compared to one year ago, the patient feels his mental health is {better worse same:82165}.    Objective     Physical Exam    There were no vitals filed for this visit.    {BMI Follow-up - Do not select before height/weight have been entered for this visit:77799}      CMP:     HbA1c:  No results found for: HGBA1C  Microalbumin:  No results found for: MICROALBUR, POCMALB, POCCREAT  Lipid Panel  No results found for: CHOL, TRIG, HDL, LDL, AST, ALT    Assessment & Plan   Patient Self-Management and Personalized Health Advice  The patient has been provided with information about: {; PERSONALIZED HEALTH ADVICE:59989} and preventive services including:   · {plan:88107}.    Problem List Items Addressed This Visit    None          There are no Patient Instructions on file for this visit.    Outpatient Encounter Medications as of 6/21/2022   Medication Sig Dispense Refill   • amphetamine-dextroamphetamine (ADDERALL) 5 MG tablet Take 1 tablet by mouth Daily at noon. 30 tablet 0   • amphetamine-dextroamphetamine (ADDERALL) 5 MG tablet Take 1 tablet by mouth in the afternoon, as booster 30 tablet 0   • amphetamine-dextroamphetamine (ADDERALL) 5 MG tablet Take one tablet by mouth  daily at noon 30 tablet 0   • amphetamine-dextroamphetamine (ADDERALL) 5 MG tablet Take 1 tablet by mouth Daily at noon 30 tablet 0   • amphetamine-dextroamphetamine (ADDERALL) 5 MG tablet Take 1 tablet by mouth Daily With Lunch. 30 tablet 0   • amphetamine-dextroamphetamine XR (ADDERALL XR) 15 MG 24 hr capsule Take 1 capsule by mouth Every Morning. 30 capsule 0   • amphetamine-dextroamphetamine XR (ADDERALL XR) 15 MG 24 hr capsule Take 1 capsule by mouth Every Morning 30 capsule 0   • amphetamine-dextroamphetamine XR (ADDERALL XR) 15 MG 24 hr capsule Take 1 capsule by mouth Every Morning 30 capsule 0   • amphetamine-dextroamphetamine XR (ADDERALL XR) 15 MG 24 hr capsule Take one (1) capsule by mouth every morning 30 capsule 0   • amphetamine-dextroamphetamine XR (ADDERALL XR) 15 MG 24 hr capsule Take 1 capsule by mouth Every Morning 30 capsule 0   • busPIRone (BUSPAR) 15 MG tablet Take 0.5 tablets by mouth 2 (Two) Times a Day for 7 days, THEN 1 tablet 2 (Two) Times a Day thereafter 60 tablet 1   • busPIRone (BUSPAR) 15 MG tablet Take 2 tablets by mouth 2 (Two) Times a Day, or as directed. 360 tablet 1   • busPIRone (BUSPAR) 15 MG tablet Take 2 tablets by mouth 2 (Two) Times a Day. 360 tablet 2   • desvenlafaxine (PRISTIQ) 100 MG 24 hr tablet Take 1 tablet by mouth Every Morning. 30 tablet 1   • desvenlafaxine (PRISTIQ) 100 MG 24 hr tablet Take 1 tablet by mouth Every Morning. 90 tablet 1   • desvenlafaxine (PRISTIQ) 100 MG 24 hr tablet Take 1 tablet by mouth Every Morning. 90 tablet 2   • diazePAM (VALIUM) 5 MG tablet Take 1.5 tablets by mouth Every Evening. 45 tablet 5   • lamoTRIgine (LaMICtal) 100 MG tablet Take 1 tablet by mouth Every Morning. 90 tablet 1   • propranolol (INDERAL) 20 MG tablet Take 1 tablet by mouth Daily As Needed for anxiety 30 tablet 1   • propranolol (INDERAL) 20 MG tablet Take 1 tablet by mouth as needed for performance anxiety 50 tablet 5   • traZODone (DESYREL) 50 MG tablet Take 0.5-1  tablets by mouth every night at bedtime. 90 tablet 0     No facility-administered encounter medications on file as of 6/21/2022.       Reviewed use of high risk medication in the elderly: {Response; yes/no/na:63}  Reviewed for potential of harmful drug interactions in the elderly: {Response; yes/no/na:63}    Follow Up:  No follow-ups on file.     An After Visit Summary and PPPS with all of these plans were given to the patient.           {Time Spent (Optional):97893}    Caitlin Gallo MA    Note: Part of this note may be an electronic transcription/translation of spoken language to printed text using the Dragon Dictation System.

## 2022-06-22 PROBLEM — Z00.00 ROUTINE MEDICAL EXAM: Status: ACTIVE | Noted: 2022-06-22

## 2024-01-11 ENCOUNTER — LAB (OUTPATIENT)
Dept: LAB | Facility: HOSPITAL | Age: 39
End: 2024-01-11
Payer: COMMERCIAL

## 2024-01-11 ENCOUNTER — OFFICE VISIT (OUTPATIENT)
Dept: INTERNAL MEDICINE | Facility: CLINIC | Age: 39
End: 2024-01-11
Payer: COMMERCIAL

## 2024-01-11 DIAGNOSIS — Z11.59 ENCOUNTER FOR HEPATITIS C SCREENING TEST FOR LOW RISK PATIENT: ICD-10-CM

## 2024-01-11 DIAGNOSIS — Z00.00 ROUTINE MEDICAL EXAM: ICD-10-CM

## 2024-01-11 DIAGNOSIS — Z13.29 THYROID DISORDER SCREENING: ICD-10-CM

## 2024-01-11 DIAGNOSIS — F51.01 PRIMARY INSOMNIA: ICD-10-CM

## 2024-01-11 DIAGNOSIS — Z13.220 LIPID SCREENING: ICD-10-CM

## 2024-01-11 DIAGNOSIS — Z13.21 ENCOUNTER FOR VITAMIN DEFICIENCY SCREENING: ICD-10-CM

## 2024-01-11 DIAGNOSIS — R74.8 ELEVATED LIVER ENZYMES: ICD-10-CM

## 2024-01-11 DIAGNOSIS — F10.21 HISTORY OF ALCOHOLISM: ICD-10-CM

## 2024-01-11 DIAGNOSIS — F32.A ANXIETY AND DEPRESSION: ICD-10-CM

## 2024-01-11 DIAGNOSIS — Z00.00 ROUTINE MEDICAL EXAM: Primary | ICD-10-CM

## 2024-01-11 DIAGNOSIS — F41.9 ANXIETY AND DEPRESSION: ICD-10-CM

## 2024-01-11 LAB
25(OH)D3 SERPL-MCNC: 26.5 NG/ML (ref 30–100)
ALBUMIN SERPL-MCNC: 4.6 G/DL (ref 3.5–5.2)
ALBUMIN/GLOB SERPL: 1.7 G/DL
ALP SERPL-CCNC: 55 U/L (ref 39–117)
ALT SERPL W P-5'-P-CCNC: 19 U/L (ref 1–41)
ANION GAP SERPL CALCULATED.3IONS-SCNC: 11.9 MMOL/L (ref 5–15)
AST SERPL-CCNC: 19 U/L (ref 1–40)
BASOPHILS # BLD AUTO: 0.05 10*3/MM3 (ref 0–0.2)
BASOPHILS NFR BLD AUTO: 0.7 % (ref 0–1.5)
BILIRUB SERPL-MCNC: 0.3 MG/DL (ref 0–1.2)
BUN SERPL-MCNC: 13 MG/DL (ref 6–20)
BUN/CREAT SERPL: 15.9 (ref 7–25)
CALCIUM SPEC-SCNC: 9.1 MG/DL (ref 8.6–10.5)
CHLORIDE SERPL-SCNC: 103 MMOL/L (ref 98–107)
CHOLEST SERPL-MCNC: 141 MG/DL (ref 0–200)
CO2 SERPL-SCNC: 26.1 MMOL/L (ref 22–29)
CREAT SERPL-MCNC: 0.82 MG/DL (ref 0.76–1.27)
DEPRECATED RDW RBC AUTO: 38.5 FL (ref 37–54)
EGFRCR SERPLBLD CKD-EPI 2021: 115.3 ML/MIN/1.73
EOSINOPHIL # BLD AUTO: 0.16 10*3/MM3 (ref 0–0.4)
EOSINOPHIL NFR BLD AUTO: 2.4 % (ref 0.3–6.2)
ERYTHROCYTE [DISTWIDTH] IN BLOOD BY AUTOMATED COUNT: 11.5 % (ref 12.3–15.4)
GLOBULIN UR ELPH-MCNC: 2.7 GM/DL
GLUCOSE SERPL-MCNC: 75 MG/DL (ref 65–99)
HCT VFR BLD AUTO: 41.5 % (ref 37.5–51)
HCV AB SER DONR QL: NORMAL
HDLC SERPL-MCNC: 39 MG/DL (ref 40–60)
HGB BLD-MCNC: 14.5 G/DL (ref 13–17.7)
IMM GRANULOCYTES # BLD AUTO: 0.02 10*3/MM3 (ref 0–0.05)
IMM GRANULOCYTES NFR BLD AUTO: 0.3 % (ref 0–0.5)
LDLC SERPL CALC-MCNC: 86 MG/DL (ref 0–100)
LDLC/HDLC SERPL: 2.18 {RATIO}
LYMPHOCYTES # BLD AUTO: 2.55 10*3/MM3 (ref 0.7–3.1)
LYMPHOCYTES NFR BLD AUTO: 37.7 % (ref 19.6–45.3)
MCH RBC QN AUTO: 32.4 PG (ref 26.6–33)
MCHC RBC AUTO-ENTMCNC: 34.9 G/DL (ref 31.5–35.7)
MCV RBC AUTO: 92.6 FL (ref 79–97)
MONOCYTES # BLD AUTO: 0.56 10*3/MM3 (ref 0.1–0.9)
MONOCYTES NFR BLD AUTO: 8.3 % (ref 5–12)
NEUTROPHILS NFR BLD AUTO: 3.43 10*3/MM3 (ref 1.7–7)
NEUTROPHILS NFR BLD AUTO: 50.6 % (ref 42.7–76)
NRBC BLD AUTO-RTO: 0 /100 WBC (ref 0–0.2)
PLATELET # BLD AUTO: 318 10*3/MM3 (ref 140–450)
PMV BLD AUTO: 9.5 FL (ref 6–12)
POTASSIUM SERPL-SCNC: 4.4 MMOL/L (ref 3.5–5.2)
PROT SERPL-MCNC: 7.3 G/DL (ref 6–8.5)
RBC # BLD AUTO: 4.48 10*6/MM3 (ref 4.14–5.8)
SODIUM SERPL-SCNC: 141 MMOL/L (ref 136–145)
T3FREE SERPL-MCNC: 3.22 PG/ML (ref 2–4.4)
T4 FREE SERPL-MCNC: 0.91 NG/DL (ref 0.93–1.7)
TRIGL SERPL-MCNC: 85 MG/DL (ref 0–150)
TSH SERPL DL<=0.05 MIU/L-ACNC: 2.4 UIU/ML (ref 0.27–4.2)
VIT B12 BLD-MCNC: 949 PG/ML (ref 211–946)
VLDLC SERPL-MCNC: 16 MG/DL (ref 5–40)
WBC NRBC COR # BLD AUTO: 6.77 10*3/MM3 (ref 3.4–10.8)

## 2024-01-11 PROCEDURE — 82306 VITAMIN D 25 HYDROXY: CPT

## 2024-01-11 PROCEDURE — 82607 VITAMIN B-12: CPT

## 2024-01-11 PROCEDURE — 84481 FREE ASSAY (FT-3): CPT

## 2024-01-11 PROCEDURE — 84443 ASSAY THYROID STIM HORMONE: CPT

## 2024-01-11 PROCEDURE — 80061 LIPID PANEL: CPT

## 2024-01-11 PROCEDURE — 80053 COMPREHEN METABOLIC PANEL: CPT

## 2024-01-11 PROCEDURE — 85025 COMPLETE CBC W/AUTO DIFF WBC: CPT

## 2024-01-11 PROCEDURE — 86803 HEPATITIS C AB TEST: CPT

## 2024-01-11 PROCEDURE — 84439 ASSAY OF FREE THYROXINE: CPT

## 2024-01-11 NOTE — PROGRESS NOTES
[facility] Internal Medicine    Diego Lloyd  1985   [id number]      Patient Care Team:  Maria Elena Chawla PA-C as PCP - General (Physician Assistant)  Chelo Wen APRN Bunge, Demond ESCOBAR MD as Consulting Physician (Psychiatry)    Chief Complaint::   Chief Complaint   Patient presents with    Annual Exam    Anxiety        HPI  Diego is a 38-year-old male date of birth 1985 who presents today for routine physical.  Past medical history significant for anxiety and depression, history of alcoholism in recovery x 2-1/2 years, insomnia, and elevated liver enzymes.  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  He enjoys his job.  Continues to see Dr. Dasilva for management of anxiety and depression.  He denies chest pain, shortness of breath, palpitations, or headaches.    Patient Active Problem List   Diagnosis    Lipid screening    Elevated liver enzymes    Thyroid disorder screening    Encounter for vitamin deficiency screening    History of alcoholism    ADHD, predominantly inattentive type    Anxiety and depression    Primary insomnia    Hearing loss of left ear due to cerumen impaction    Routine medical exam        Past Medical History:   Diagnosis Date    Anxiety     Depression        No past surgical history on file.    Family History   Problem Relation Age of Onset    Hypertension Mother     Hypertension Father     Stroke Paternal Uncle     Cancer Maternal Grandmother     Cancer Paternal Grandfather        Social History     Socioeconomic History    Marital status:    Tobacco Use    Smoking status: Never    Smokeless tobacco: Never   Substance and Sexual Activity    Alcohol use: Not Currently     Comment: stopped 11/11/21    Drug use: Never    Sexual activity: Defer       No Known Allergies    Review of Systems   Constitutional:  Negative for activity change, appetite change, diaphoresis, fatigue, unexpected weight gain and unexpected weight  loss.   HENT:  Negative for hearing loss.    Eyes:  Negative for visual disturbance.   Respiratory:  Negative for chest tightness and shortness of breath.    Cardiovascular:  Negative for chest pain, palpitations and leg swelling.   Gastrointestinal:  Negative for abdominal pain, blood in stool, GERD and indigestion.   Endocrine: Negative for cold intolerance and heat intolerance.   Genitourinary:  Negative for dysuria and hematuria.   Musculoskeletal:  Negative for arthralgias and myalgias.   Skin:  Negative for skin lesions.   Neurological:  Negative for tremors, seizures, syncope, speech difficulty, weakness, headache, memory problem and confusion.   Hematological:  Does not bruise/bleed easily.   Psychiatric/Behavioral:  Negative for sleep disturbance and depressed mood. The patient is not nervous/anxious.         Vital Signs  There were no vitals filed for this visit.  There is no height or weight on file to calculate BMI.  BMI is >= 25 and <30. (Overweight) The following options were offered after discussion;: weight loss educational material (shared in after visit summary), exercise counseling/recommendations, and nutrition counseling/recommendations     Advance Care Planning   ACP discussion was held with the patient during this visit. Patient does not have an advance directive, information provided.       Current Outpatient Medications:     busPIRone (BUSPAR) 15 MG tablet, Take 0.5 tablets by mouth 2 (Two) Times a Day for 7 days, THEN 1 tablet 2 (Two) Times a Day thereafter, Disp: 60 tablet, Rfl: 1    busPIRone (BUSPAR) 15 MG tablet, Take 2 tablets by mouth 2 (Two) Times a Day., Disp: 360 tablet, Rfl: 2    desvenlafaxine (PRISTIQ) 100 MG 24 hr tablet, Take 1 tablet by mouth Every Morning., Disp: 90 tablet, Rfl: 2    lamoTRIgine (LaMICtal) 100 MG tablet, Take 1 tablet by mouth Every Morning., Disp: 90 tablet, Rfl: 2    propranolol (INDERAL) 20 MG tablet, Take 1 tablet by mouth as needed for performance  anxiety, Disp: 50 tablet, Rfl: 5    Physical Exam  Vitals reviewed.   Constitutional:       Appearance: Normal appearance. He is well-developed.   HENT:      Head: Normocephalic and atraumatic.      Right Ear: Hearing, tympanic membrane, ear canal and external ear normal.      Left Ear: Hearing, tympanic membrane, ear canal and external ear normal.      Nose: Nose normal.      Mouth/Throat:      Mouth: Mucous membranes are moist.      Pharynx: Oropharynx is clear. Uvula midline.   Eyes:      General: Lids are normal.      Conjunctiva/sclera: Conjunctivae normal.      Pupils: Pupils are equal, round, and reactive to light.   Cardiovascular:      Rate and Rhythm: Normal rate and regular rhythm.      Heart sounds: Normal heart sounds.   Pulmonary:      Effort: Pulmonary effort is normal.      Breath sounds: Normal breath sounds.   Abdominal:      General: Bowel sounds are normal.      Palpations: Abdomen is soft.   Musculoskeletal:         General: Normal range of motion.      Cervical back: Full passive range of motion without pain, normal range of motion and neck supple.   Skin:     General: Skin is warm and dry.   Neurological:      General: No focal deficit present.      Mental Status: He is alert and oriented to person, place, and time. Mental status is at baseline.      Deep Tendon Reflexes: Reflexes are normal and symmetric.   Psychiatric:         Speech: Speech normal.         Behavior: Behavior normal.         Thought Content: Thought content normal.         Judgment: Judgment normal.          ACE III MINI            Results Review:    No results found for this or any previous visit (from the past 672 hour(s)).  Procedures    Medication Review: Medications reviewed and noted    Social History     Socioeconomic History    Marital status:    Tobacco Use    Smoking status: Never    Smokeless tobacco: Never   Substance and Sexual Activity    Alcohol use: Not Currently     Comment: stopped 11/11/21    Drug  use: Never    Sexual activity: Defer        Assessment/Plan:    Diagnoses and all orders for this visit:    1. Routine medical exam (Primary)  Overview:  He is current on immunizations-Tdap today  Fasting labs today.  Discussed importance of regular cardiovascular exercise routine.      2. Primary insomnia  Overview:  Has been sleeping well, no issues.      3. Anxiety and depression  Overview:  Currently treated with desvenlafaxine 100 mg tablets daily. Continue lamotrigine 100 mg tablets in the morning.  Continue buspirone 15 mg twice daily.  He rarely takes propranolol 20 mg tablets for anxiety.      4. History of alcoholism  Overview:  2-1/2 years sober.  Wife supportive, parents also supportive.              There are no Patient Instructions on file for this visit.     Plan of care reviewed with patient at the conclusion of today's visit. Education was provided regarding diagnosis, management, and any prescribed or recommended OTC medications.Patient verbalizes understanding of and agreement with management plan.         I spent 20 minutes caring for Diego on this date of service. This time includes time spent by me in the following activities:preparing for the visit, reviewing tests, obtaining and/or reviewing a separately obtained history, performing a medically appropriate examination and/or evaluation , counseling and educating the patient/family/caregiver, ordering medications, tests, or procedures, referring and communicating with other health care professionals , and documenting information in the medical record    Maria Elena Chawla PA-C      Note: Part of this note may be an electronic transcription/translation of spoken language to printed text using the Dragon Dictation system.

## 2024-01-11 NOTE — PATIENT INSTRUCTIONS
"BMI for Adults  What is BMI?  Body mass index (BMI) is a number that is calculated from a person's weight and height. BMI can help estimate how much of a person's weight is composed of fat. BMI does not measure body fat directly. Rather, it is an alternative to procedures that directly measure body fat, which can be difficult and expensive.  BMI can help identify people who may be at higher risk for certain medical problems.  What are BMI measurements used for?  BMI is used as a screening tool to identify possible weight problems. It helps determine whether a person is obese, overweight, a healthy weight, or underweight.  BMI is useful for:  Identifying a weight problem that may be related to a medical condition or may increase the risk for medical problems.  Promoting changes, such as changes in diet and exercise, to help reach a healthy weight. BMI screening can be repeated to see if these changes are working.  How is BMI calculated?  BMI involves measuring your weight in relation to your height. Both height and weight are measured, and the BMI is calculated from those numbers. This can be done either in English ([place] or metric measurements. Note that charts and online BMI calculators are available to help you find your BMI quickly and easily without having to do these calculations yourself.  To calculate your BMI in English (U[place] measurements:    Measure your weight in pounds (lb).  Multiply the number of pounds by 703.  For example, for a person who weighs 180 lb, multiply that number by 703, which equals 126,540.  Measure your height in inches. Then multiply that number by itself to get a measurement called \"inches squared.\"  For example, for a person who is 70 inches tall, the \"inches squared\" measurement is 70 inches x 70 inches, which equals 4,900 inches squared.  Divide the total from step 2 (number of lb x 703) by the total from step 3 (inches squared): 126,540 ÷ 4,900 = 25.8. This is your BMI.  To " "calculate your BMI in metric measurements:  Measure your weight in kilograms (kg).  Measure your height in meters (m). Then multiply that number by itself to get a measurement called \"meters squared.\"  For example, for a person who is 1.75 m tall, the \"meters squared\" measurement is 1.75 m x 1.75 m, which is equal to 3.1 meters squared.  Divide the number of kilograms (your weight) by the meters squared number. In this example: 70 ÷ 3.1 = 22.6. This is your BMI.  What do the results mean?  BMI charts are used to identify whether you are underweight, normal weight, overweight, or obese. The following guidelines will be used:  Underweight: BMI less than 18.5.  Normal weight: BMI between 18.5 and 24.9.  Overweight: BMI between 25 and 29.9.  Obese: BMI of 30 or above.  Keep these notes in mind:  Weight includes both fat and muscle, so someone with a muscular build, such as an athlete, may have a BMI that is higher than 24.9. In cases like these, BMI is not an accurate measure of body fat.  To determine if excess body fat is the cause of a BMI of 25 or higher, further assessments may need to be done by a health care provider.  BMI is usually interpreted in the same way for men and women.  Where to find more information  For more information about BMI, including tools to quickly calculate your BMI, go to these websites:  Centers for Disease Control and Prevention: www.cdc.gov  American Heart Association: www.heart.org  National Heart, Lung, and Blood [place]: www.nhlbi.nih.gov  Summary  Body mass index (BMI) is a number that is calculated from a person's weight and height.  BMI may help estimate how much of a person's weight is composed of fat. BMI can help identify those who may be at higher risk for certain medical problems.  BMI can be measured using English measurements or metric measurements.  BMI charts are used to identify whether you are underweight, normal weight, overweight, or obese.  This information is not " intended to replace advice given to you by your health care provider. Make sure you discuss any questions you have with your health care provider.  Document Revised: 05/31/2023 Document Reviewed: 07/17/2020  Elsevier Patient Education © 2023 [employer]  Advance Directive    Advance directives are legal documents that allow you to make decisions about your health care and medical treatment in case you become unable to communicate for yourself. Advance directives let your wishes be known to family, friends, and health care providers.  Discussing and writing advance directives should happen over time rather than all at once. Advance directives can be changed and updated at any time. There are different types of advance directives, such as:  Medical power of .  Living will.  Do not resuscitate (DNR) order or do not attempt resuscitation (DNAR) order.  Health care proxy and medical power of   A health care proxy is also called a health care agent. This person is appointed to make medical decisions for you when you are unable to make decisions for yourself. Generally, people ask a trusted friend or family member to act as their proxy and represent their preferences. Make sure you have an agreement with your trusted person to act as your proxy. A proxy may have to make a medical decision on your behalf if your wishes are not known.  A medical power of , also called a durable power of  for health care, is a legal document that names your health care proxy. Depending on the laws in your state, the document may need to be:  Signed.  Notarized.  Dated.  Copied.  Witnessed.  Incorporated into your medical record.  You may also want to appoint a trusted person to manage your money in the event you are unable to do so. This is called a durable power of  for finances. It is a separate legal document from the durable power of  for health care. You may choose your health care proxy  or someone different to act as your agent in money matters.  If you do not appoint a proxy, or there is a concern that the proxy is not acting in your best interest, a court may appoint a guardian to act on your behalf.  Living will  A living will is a set of instructions that state your wishes about medical care when you cannot express them yourself. Health care providers should keep a copy of your living will in your medical record. You may want to give a copy to family members or friends. To alert caregivers in case of an emergency, you can place a card in your wallet to let them know that you have a living will and where they can find it. A living will is used if you become:  Terminally ill.  Disabled.  Unable to communicate or make decisions.  The following decisions should be included in your living will:  To use or not to use life support equipment, such as dialysis machines and breathing machines (ventilators).  Whether you want a DNR or DNAR order. This tells health care providers not to use cardiopulmonary resuscitation (CPR) if breathing or heartbeat stops.  To use or not to use tube feeding.  To be given or not to be given food and fluids.  Whether you want comfort (palliative) care when the goal becomes comfort rather than a cure.  Whether you want to donate your organs and tissues.  A living will does not give instructions for distributing your money and property if you should pass away.  DNR or DNAR  A DNR or DNAR order is a request not to have CPR in the event that your heart stops beating or you stop breathing. If a DNR or DNAR order has not been made and shared, a health care provider will try to help any patient whose heart has stopped or who has stopped breathing. If you plan to have surgery, talk with your health care provider about how your DNR or DNAR order will be followed if problems occur.  What if I do not have an advance directive?  Some states assign family decision makers to act on your  behalf if you do not have an advance directive. Each state has its own laws about advance directives. You may want to check with your health care provider, , or state representative about the laws in your state.  Summary  Advance directives are legal documents that allow you to make decisions about your health care and medical treatment in case you become unable to communicate for yourself.  The process of discussing and writing advance directives should happen over time. You can change and update advance directives at any time.  Advance directives may include a medical power of , a living will, and a DNR or DNAR order.  This information is not intended to replace advice given to you by your health care provider. Make sure you discuss any questions you have with your health care provider.  Document Revised: 09/21/2021 Document Reviewed: 09/21/2021  Elsevier Patient Education © 2023 [employer]

## 2024-12-21 ENCOUNTER — PATIENT MESSAGE (OUTPATIENT)
Dept: INTERNAL MEDICINE | Facility: CLINIC | Age: 39
End: 2024-12-21
Payer: COMMERCIAL

## 2024-12-23 RX ORDER — DESVENLAFAXINE 100 MG/1
100 TABLET, EXTENDED RELEASE ORAL EVERY MORNING
Qty: 90 TABLET | Refills: 2 | Status: SHIPPED | OUTPATIENT
Start: 2024-12-23 | End: 2024-12-27 | Stop reason: SDUPTHER

## 2024-12-23 RX ORDER — LAMOTRIGINE 100 MG/1
100 TABLET ORAL EVERY MORNING
Qty: 90 TABLET | Refills: 2 | Status: SHIPPED | OUTPATIENT
Start: 2024-12-23 | End: 2024-12-27 | Stop reason: SDUPTHER

## 2024-12-23 RX ORDER — BUSPIRONE HYDROCHLORIDE 15 MG/1
30 TABLET ORAL 2 TIMES DAILY
Qty: 360 TABLET | Refills: 2 | Status: SHIPPED | OUTPATIENT
Start: 2024-12-23 | End: 2024-12-27 | Stop reason: SDUPTHER

## 2024-12-27 RX ORDER — DESVENLAFAXINE 100 MG/1
100 TABLET, EXTENDED RELEASE ORAL EVERY MORNING
Qty: 90 TABLET | Refills: 2 | Status: SHIPPED | OUTPATIENT
Start: 2024-12-27

## 2024-12-27 RX ORDER — BUSPIRONE HYDROCHLORIDE 15 MG/1
30 TABLET ORAL 2 TIMES DAILY
Qty: 360 TABLET | Refills: 2 | Status: SHIPPED | OUTPATIENT
Start: 2024-12-27

## 2024-12-27 RX ORDER — LAMOTRIGINE 100 MG/1
100 TABLET ORAL EVERY MORNING
Qty: 90 TABLET | Refills: 2 | Status: SHIPPED | OUTPATIENT
Start: 2024-12-27

## 2025-01-13 ENCOUNTER — OFFICE VISIT (OUTPATIENT)
Dept: INTERNAL MEDICINE | Facility: CLINIC | Age: 40
End: 2025-01-13
Payer: COMMERCIAL

## 2025-01-13 ENCOUNTER — LAB (OUTPATIENT)
Dept: LAB | Facility: HOSPITAL | Age: 40
End: 2025-01-13
Payer: COMMERCIAL

## 2025-01-13 VITALS
HEART RATE: 79 BPM | TEMPERATURE: 97.7 F | WEIGHT: 192.8 LBS | OXYGEN SATURATION: 97 % | BODY MASS INDEX: 26.99 KG/M2 | SYSTOLIC BLOOD PRESSURE: 104 MMHG | DIASTOLIC BLOOD PRESSURE: 80 MMHG | HEIGHT: 71 IN

## 2025-01-13 DIAGNOSIS — F10.21 HISTORY OF ALCOHOLISM: ICD-10-CM

## 2025-01-13 DIAGNOSIS — F41.9 ANXIETY AND DEPRESSION: ICD-10-CM

## 2025-01-13 DIAGNOSIS — Z13.29 THYROID DISORDER SCREENING: ICD-10-CM

## 2025-01-13 DIAGNOSIS — Z13.220 LIPID SCREENING: ICD-10-CM

## 2025-01-13 DIAGNOSIS — Z00.00 ROUTINE MEDICAL EXAM: Primary | ICD-10-CM

## 2025-01-13 DIAGNOSIS — F32.A ANXIETY AND DEPRESSION: ICD-10-CM

## 2025-01-13 DIAGNOSIS — Z13.21 ENCOUNTER FOR VITAMIN DEFICIENCY SCREENING: ICD-10-CM

## 2025-01-13 DIAGNOSIS — F90.2 ATTENTION DEFICIT HYPERACTIVITY DISORDER (ADHD), COMBINED TYPE: ICD-10-CM

## 2025-01-13 DIAGNOSIS — Z00.00 ROUTINE MEDICAL EXAM: ICD-10-CM

## 2025-01-13 LAB
25(OH)D3 SERPL-MCNC: 30.2 NG/ML (ref 30–100)
ALBUMIN SERPL-MCNC: 4.5 G/DL (ref 3.5–5.2)
ALBUMIN/GLOB SERPL: 1.7 G/DL
ALP SERPL-CCNC: 61 U/L (ref 39–117)
ALT SERPL W P-5'-P-CCNC: 22 U/L (ref 1–41)
ANION GAP SERPL CALCULATED.3IONS-SCNC: 9 MMOL/L (ref 5–15)
AST SERPL-CCNC: 21 U/L (ref 1–40)
BASOPHILS # BLD AUTO: 0.04 10*3/MM3 (ref 0–0.2)
BASOPHILS NFR BLD AUTO: 0.6 % (ref 0–1.5)
BILIRUB SERPL-MCNC: 0.2 MG/DL (ref 0–1.2)
BUN SERPL-MCNC: 10 MG/DL (ref 6–20)
BUN/CREAT SERPL: 11.6 (ref 7–25)
CALCIUM SPEC-SCNC: 9.4 MG/DL (ref 8.6–10.5)
CHLORIDE SERPL-SCNC: 102 MMOL/L (ref 98–107)
CHOLEST SERPL-MCNC: 126 MG/DL (ref 0–200)
CO2 SERPL-SCNC: 29 MMOL/L (ref 22–29)
CREAT SERPL-MCNC: 0.86 MG/DL (ref 0.76–1.27)
DEPRECATED RDW RBC AUTO: 38.9 FL (ref 37–54)
EGFRCR SERPLBLD CKD-EPI 2021: 113 ML/MIN/1.73
EOSINOPHIL # BLD AUTO: 0.2 10*3/MM3 (ref 0–0.4)
EOSINOPHIL NFR BLD AUTO: 2.9 % (ref 0.3–6.2)
ERYTHROCYTE [DISTWIDTH] IN BLOOD BY AUTOMATED COUNT: 11.6 % (ref 12.3–15.4)
GLOBULIN UR ELPH-MCNC: 2.7 GM/DL
GLUCOSE SERPL-MCNC: 71 MG/DL (ref 65–99)
HCT VFR BLD AUTO: 42.6 % (ref 37.5–51)
HDLC SERPL-MCNC: 43 MG/DL (ref 40–60)
HGB BLD-MCNC: 14.7 G/DL (ref 13–17.7)
IMM GRANULOCYTES # BLD AUTO: 0.03 10*3/MM3 (ref 0–0.05)
IMM GRANULOCYTES NFR BLD AUTO: 0.4 % (ref 0–0.5)
LDLC SERPL CALC-MCNC: 66 MG/DL (ref 0–100)
LDLC/HDLC SERPL: 1.53 {RATIO}
LYMPHOCYTES # BLD AUTO: 2.77 10*3/MM3 (ref 0.7–3.1)
LYMPHOCYTES NFR BLD AUTO: 40.4 % (ref 19.6–45.3)
MCH RBC QN AUTO: 32 PG (ref 26.6–33)
MCHC RBC AUTO-ENTMCNC: 34.5 G/DL (ref 31.5–35.7)
MCV RBC AUTO: 92.6 FL (ref 79–97)
MONOCYTES # BLD AUTO: 0.56 10*3/MM3 (ref 0.1–0.9)
MONOCYTES NFR BLD AUTO: 8.2 % (ref 5–12)
NEUTROPHILS NFR BLD AUTO: 3.26 10*3/MM3 (ref 1.7–7)
NEUTROPHILS NFR BLD AUTO: 47.5 % (ref 42.7–76)
NRBC BLD AUTO-RTO: 0 /100 WBC (ref 0–0.2)
PLATELET # BLD AUTO: 324 10*3/MM3 (ref 140–450)
PMV BLD AUTO: 9.2 FL (ref 6–12)
POTASSIUM SERPL-SCNC: 4.4 MMOL/L (ref 3.5–5.2)
PROT SERPL-MCNC: 7.2 G/DL (ref 6–8.5)
RBC # BLD AUTO: 4.6 10*6/MM3 (ref 4.14–5.8)
SODIUM SERPL-SCNC: 140 MMOL/L (ref 136–145)
T3FREE SERPL-MCNC: 3.22 PG/ML (ref 2–4.4)
T4 FREE SERPL-MCNC: 0.95 NG/DL (ref 0.92–1.68)
TRIGL SERPL-MCNC: 85 MG/DL (ref 0–150)
TSH SERPL DL<=0.05 MIU/L-ACNC: 3.51 UIU/ML (ref 0.27–4.2)
VIT B12 BLD-MCNC: 1136 PG/ML (ref 211–946)
VLDLC SERPL-MCNC: 17 MG/DL (ref 5–40)
WBC NRBC COR # BLD AUTO: 6.86 10*3/MM3 (ref 3.4–10.8)

## 2025-01-13 PROCEDURE — 82607 VITAMIN B-12: CPT

## 2025-01-13 PROCEDURE — 99213 OFFICE O/P EST LOW 20 MIN: CPT | Performed by: PHYSICIAN ASSISTANT

## 2025-01-13 PROCEDURE — 84443 ASSAY THYROID STIM HORMONE: CPT

## 2025-01-13 PROCEDURE — 85025 COMPLETE CBC W/AUTO DIFF WBC: CPT

## 2025-01-13 PROCEDURE — 99395 PREV VISIT EST AGE 18-39: CPT | Performed by: PHYSICIAN ASSISTANT

## 2025-01-13 PROCEDURE — 84481 FREE ASSAY (FT-3): CPT

## 2025-01-13 PROCEDURE — 82306 VITAMIN D 25 HYDROXY: CPT

## 2025-01-13 PROCEDURE — 80053 COMPREHEN METABOLIC PANEL: CPT

## 2025-01-13 PROCEDURE — 84439 ASSAY OF FREE THYROXINE: CPT

## 2025-01-13 PROCEDURE — 80061 LIPID PANEL: CPT

## 2025-01-13 NOTE — PATIENT INSTRUCTIONS
"BMI for Adults  Body mass index (BMI) is a number found using a person's weight and height. BMI can help tell how much of a person's weight is made up of fat. BMI does not measure body fat directly. It is used instead of tests that directly measure body fat, which can be difficult and expensive.  What are BMI measurements used for?  BMI is useful to:  Find out if your weight puts you at higher risk for medical problems.  Help recommend changes, such as in diet and exercise. This can help you reach a healthy weight. BMI screening can be done again to see if these changes are working.  How is BMI calculated?  Your height and weight are measured. The BMI is found from those numbers. This can be done with [place] or metric measurements. Note that charts and online BMI calculators are available to help you find your BMI quickly and easily without doing these calculations.  To calculate your BMI in [place] measurements:  Measure your weight in pounds (lb).  Multiply the number of pounds by 703.  So, for an adult who weighs 150 lb, multiply that number by 703: 150 x 703, which equals 105,450.  Measure your height in inches. Then multiply that number by itself to get a measurement called \"inches squared.\"  So, for an adult who is 70 inches tall, the \"inches squared\" measurement is 70 inches x 70 inches, which equals 4,900 inches squared.  Divide the total from step 2 (number of lb x 703) by the total from step 3 (inches squared): 105,450 ÷ 4,900 = 21.5. This is your BMI.  To calculate your BMI in metric measurements:    Measure your weight in kilograms (kg).  For this example, the weight is 70 kg.  Measure your height in meters (m). Then multiply that number by itself to get a measurement called \"meters squared.\"  So, for an adult who is 1.75 m tall, the \"meters squared\" measurement is 1.75 m x 1.75 m, which equals 3.1 meters squared.  Divide the number of kilograms (your weight) by the meters squared number. In this example: 70 " ÷ 3.1 = 22.6. This is your BMI.  What do the results mean?  BMI charts are used to see if you are underweight, normal weight, overweight, or obese. The following guidelines will be used:  Underweight: BMI less than 18.5.  Normal weight: BMI between 18.5 and 24.9.  Overweight: BMI between 25 and 29.9.  Obese: BMI of 30 or above.  BMI is a tool and cannot diagnose a condition. Talk with your health care provider about what your BMI means for you. Keep these notes in mind:  Weight includes fat and muscle. Someone with a muscular build, such as an athlete, may have a BMI that is higher than 24.9. In cases like these, BMI is not a correct measure of body fat.  If you have a BMI of 25 or higher, your provider may need to do more testing to find out if excess body fat is the cause.  BMI is measured the same way for males and females. Females usually have more body fat than males of the same height and weight.  Where to find more information  For more information about BMI, including tools to quickly find your BMI, go to:  Centers for Disease Control and Prevention: cdc.gov  American Heart Association: heart.org  National Heart, Lung, and Blood [place]: nhlbi.nih.gov  This information is not intended to replace advice given to you by your health care provider. Make sure you discuss any questions you have with your health care provider.  Document Revised: 09/07/2023 Document Reviewed: 08/31/2023  [employer] Patient Education © 2024 [employer]Advance Directive    Advance directives are legal papers that state your wishes about health care decisions. They let your wishes be known to family, friends, and health care providers if you become unable to speak for yourself.   You should write these papers out over time rather than all at once. They can be changed and updated at any time.  The types of advance directives include:  Medical power of  (POA).  Living [place].  Do not resuscitate (DNR) or do not attempt  resuscitation (DNAR) orders.  What are a health care proxy and medical POA?  A health care proxy is also called a health care agent. It's a person you choose to make medical decisions for you when you can't make them for yourself. In most cases, a proxy is a trusted friend or family member.  A medical POA is legal paperwork that names your proxy. It may need to be:  Signed.  Notarized.  Dated.  Copied.  Witnessed.  Added to your medical record.  You may also want to choose someone to handle your money if you can't do so. This is called a durable POA for finances. It's separate from a medical POA. You may choose your health care proxy or someone else to act as your agent in money matters.  If you don't have a proxy, or if the proxy may not be acting in your best interest, a court may choose a guardian to act on your behalf.  What is a living will?  A living will is legal paperwork that states your wishes about medical care. Providers should keep a copy of it in your medical record. You may want to give a copy to family members or friends. You can also keep a card in your wallet to let loved ones know you have a living will and where they can find it. A living will may be used if:  You're very sick with something that will end your life.  You become disabled.  You can't make decisions or speak for yourself.  Your living will should include whether:  To use or not use life support equipment. This may include machines to filter your blood or to help you breathe.  You want a DNR or DNAR order. This tells providers not to use CPR if your heart or breathing stops.  To use or not use tube feeding.  You want to be given foods and fluids.  You want a type of comfort care called palliative care. This may be given when the goal for treatment becomes comfort rather than a cure.  You want to donate your organs and tissues.  A living will doesn't say what to do with your money and property if you pass away.  What is a DNR or  DNAR?  A DNR or DNAR order is a request not to have CPR. If you don't have one of these orders, a provider will try to help you if your heart stops or you stop breathing.   If you plan to have surgery, talk with your provider about your DNR or DNAR order.  What happens if I don't have an advance directive?  Each state has its own laws about advance directives. Some states assign family decision makers to act on your behalf if you don't have an advance directive.   Check with your provider, , or state representative about the laws in your state.  Where to find more information  Each state has its own laws about advance directives. You can look up these laws at: [facility].org  This information is not intended to replace advice given to you by your health care provider. Make sure you discuss any questions you have with your health care provider.  Document Revised: 05/06/2024 Document Reviewed: 05/06/2024  [employer]vier Patient Education © 2024 [employer]

## 2025-01-13 NOTE — PROGRESS NOTES
Male Physical Note      Name: Diego Lloyd    : 1985     MRN: [id number]   Care Team: Patient Care Team:  Maria Elena Chawla PA-C as PCP - General (Physician Assistant)  Chelo Wne APRN Bunge, Demond ESCOBAR MD as Consulting Physician (Psychiatry)    Chief Complaint  Annual Exam and ADHD    Subjective     History of Present Illness  The patient is a 39-year-old male who presents for a routine physical exam.    He maintains regular dental check-ups and reports no current visual disturbances. His last ophthalmological examination was conducted approximately one year ago, with the next one scheduled for this month. He has not received any influenza or COVID-19 vaccines this year. He adheres to a balanced diet but acknowledges a lack of physical activity. He reports no chest pain, dyspnea, or palpitations. He has been abstinent from alcohol for the past 3.5 years and attends monthly counseling sessions. He has no new health concerns and reports no recent illnesses. He expresses interest in having fasting labs conducted today. He has previously experienced deficiencies in vitamins B12 and D. He has been using Debrox intermittently for ear care, which he finds beneficial. He experienced a sore throat about a month ago. He has previously been on medication for ADHD and is considering resuming it.      SOCIAL HISTORY  He works as a physical therapy aide. He has been sober for 3.5 years.    FAMILY HISTORY  He reports no family history of colon cancer or any other types of cancer.    MEDICATIONS  Current: lamotrigine, Pristiq, BuSpar    IMMUNIZATIONS  He did not receive the influenza vaccine or any COVID-19 boosters this year.      The patient is being seen for a health maintenance evaluation.    Past Medical History:   Diagnosis Date    Anxiety     Depression      History reviewed. No pertinent surgical history.  Family History   Problem Relation Age of Onset    Hypertension Mother      "Hypertension Father     Stroke Paternal Uncle     Cancer Maternal Grandmother     Cancer Paternal Grandfather      Social History     Tobacco Use   Smoking Status Never   Smokeless Tobacco Never     No Known Allergies    Current Outpatient Medications:     busPIRone (BUSPAR) 15 MG tablet, Take 2 tablets by mouth 2 (Two) Times a Day., Disp: 360 tablet, Rfl: 2    desvenlafaxine (PRISTIQ) 100 MG 24 hr tablet, Take 1 tablet by mouth Every Morning., Disp: 90 tablet, Rfl: 2    lamoTRIgine (LaMICtal) 100 MG tablet, Take 1 tablet by mouth Every Morning., Disp: 90 tablet, Rfl: 2    propranolol (INDERAL) 20 MG tablet, Take 1 tablet by mouth as needed for performance anxiety, Disp: 50 tablet, Rfl: 5    General History  Diego  does have regular dental visits.  He does not complain of vision problems. Last eye exam was 1 year ago  Immunizations are up to date.     Lifestyle  Diego  consumes a in general, a \"healthy\" diet  .  He exercises rarely.    Screening  PSA is due next year.    Last Completed Colonoscopy       This patient has no relevant Health Maintenance data.        . Family history of colon cancer:negative  Other pertinent family history and/or screenings: negative    PHQ-9 Total Score:  0      Objective     Vital Signs  /80 (BP Location: Left arm, Patient Position: Sitting, Cuff Size: Adult)   Pulse 79   Temp 97.7 °F (36.5 °C) (Temporal)   Ht 181 cm (71.26\")   Wt 87.5 kg (192 lb 12.8 oz)   SpO2 97%   BMI 26.69 kg/m²   Estimated body mass index is 26.69 kg/m² as calculated from the following:    Height as of this encounter: 181 cm (71.26\").    Weight as of this encounter: 87.5 kg (192 lb 12.8 oz).    BMI is >= 25 and <30. (Overweight) The following options were offered after discussion;: weight loss educational material (shared in after visit summary), exercise counseling/recommendations, and nutrition counseling/recommendations      Physical Exam  Vitals reviewed.   Constitutional:       Appearance: " Normal appearance. He is well-developed.   HENT:      Head: Normocephalic and atraumatic.      Right Ear: Hearing, tympanic membrane, ear canal and external ear normal.      Left Ear: Hearing, tympanic membrane, ear canal and external ear normal.      Nose: Nose normal.      Mouth/Throat:      Mouth: Mucous membranes are moist.      Pharynx: Oropharynx is clear. Uvula midline.   Eyes:      General: Lids are normal.      Conjunctiva/sclera: Conjunctivae normal.      Pupils: Pupils are equal, round, and reactive to light.   Cardiovascular:      Rate and Rhythm: Normal rate and regular rhythm.      Heart sounds: Normal heart sounds.   Pulmonary:      Effort: Pulmonary effort is normal.      Breath sounds: Normal breath sounds.   Abdominal:      General: Bowel sounds are normal.      Palpations: Abdomen is soft.   Musculoskeletal:         General: Normal range of motion.      Cervical back: Full passive range of motion without pain, normal range of motion and neck supple.   Skin:     General: Skin is warm and dry.   Neurological:      Mental Status: He is alert and oriented to person, place, and time.      Deep Tendon Reflexes: Reflexes are normal and symmetric.   Psychiatric:         Speech: Speech normal.         Behavior: Behavior normal.         Thought Content: Thought content normal.         Judgment: Judgment normal.       Assessment and Plan     Diagnoses and all orders for this visit:    1. Routine medical exam (Primary)  -     CBC & Differential; Future  -     Comprehensive Metabolic Panel; Future    2. History of alcoholism    3. Lipid screening  -     Lipid Panel; Future    4. Thyroid disorder screening  -     TSH; Future  -     T4, Free; Future  -     T3, Free; Future    5. Encounter for vitamin deficiency screening  -     Vitamin B12; Future  -     Vitamin D,25-Hydroxy; Future    6. Anxiety and depression  -     Ambulatory Referral to Behavioral Health    7. Attention deficit hyperactivity disorder (ADHD),  "combined type  -     Ambulatory Referral to Behavioral Health      Assessment & Plan  1. Health maintenance.  His blood pressure readings are within the normal range. He has not received the influenza vaccine or any COVID-19 boosters this year. He is advised to incorporate regular exercise into his routine. A comprehensive panel of laboratory tests has been ordered, including cholesterol screening, thyroid function tests, vitamin levels, and kidney and liver function tests. A referral to behavioral health has been made.    2. Bipolar disorder.  He is currently taking lamotrigine, Pristiq, and BuSpar. He reports that these medications are effective. He is advised to continue his current medication regimen.    Patient Instructions   BMI for Adults  Body mass index (BMI) is a number found using a person's weight and height. BMI can help tell how much of a person's weight is made up of fat. BMI does not measure body fat directly. It is used instead of tests that directly measure body fat, which can be difficult and expensive.  What are BMI measurements used for?  BMI is useful to:  Find out if your weight puts you at higher risk for medical problems.  Help recommend changes, such as in diet and exercise. This can help you reach a healthy weight. BMI screening can be done again to see if these changes are working.  How is BMI calculated?  Your height and weight are measured. The BMI is found from those numbers. This can be done with U.[place] or metric measurements. Note that charts and online BMI calculators are available to help you find your BMI quickly and easily without doing these calculations.  To calculate your BMI in U.S[place] measurements:  Measure your weight in pounds (lb).  Multiply the number of pounds by 703.  So, for an adult who weighs 150 lb, multiply that number by 703: 150 x 703, which equals 105,450.  Measure your height in inches. Then multiply that number by itself to get a measurement called \"inches " "squared.\"  So, for an adult who is 70 inches tall, the \"inches squared\" measurement is 70 inches x 70 inches, which equals 4,900 inches squared.  Divide the total from step 2 (number of lb x 703) by the total from step 3 (inches squared): 105,450 ÷ 4,900 = 21.5. This is your BMI.  To calculate your BMI in metric measurements:    Measure your weight in kilograms (kg).  For this example, the weight is 70 kg.  Measure your height in meters (m). Then multiply that number by itself to get a measurement called \"meters squared.\"  So, for an adult who is 1.75 m tall, the \"meters squared\" measurement is 1.75 m x 1.75 m, which equals 3.1 meters squared.  Divide the number of kilograms (your weight) by the meters squared number. In this example: 70 ÷ 3.1 = 22.6. This is your BMI.  What do the results mean?  BMI charts are used to see if you are underweight, normal weight, overweight, or obese. The following guidelines will be used:  Underweight: BMI less than 18.5.  Normal weight: BMI between 18.5 and 24.9.  Overweight: BMI between 25 and 29.9.  Obese: BMI of 30 or above.  BMI is a tool and cannot diagnose a condition. Talk with your health care provider about what your BMI means for you. Keep these notes in mind:  Weight includes fat and muscle. Someone with a muscular build, such as an athlete, may have a BMI that is higher than 24.9. In cases like these, BMI is not a correct measure of body fat.  If you have a BMI of 25 or higher, your provider may need to do more testing to find out if excess body fat is the cause.  BMI is measured the same way for males and females. Females usually have more body fat than males of the same height and weight.  Where to find more information  For more information about BMI, including tools to quickly find your BMI, go to:  Centers for Disease Control and Prevention: cdc.gov  American Heart Association: heart.org  National Heart, Lung, and Blood [place]: nhlbi.nih.gov  This information is " not intended to replace advice given to you by your health care provider. Make sure you discuss any questions you have with your health care provider.  Document Revised: 09/07/2023 Document Reviewed: 08/31/2023  [employer] Patient Education © 2024 [employer]Advance Directive    Advance directives are legal papers that state your wishes about health care decisions. They let your wishes be known to family, friends, and health care providers if you become unable to speak for yourself.   You should write these papers out over time rather than all at once. They can be changed and updated at any time.  The types of advance directives include:  Medical power of  (POA).  Living crowell.  Do not resuscitate (DNR) or do not attempt resuscitation (DNAR) orders.  What are a health care proxy and medical POA?  A health care proxy is also called a health care agent. It's a person you choose to make medical decisions for you when you can't make them for yourself. In most cases, a proxy is a trusted friend or family member.  A medical POA is legal paperwork that names your proxy. It may need to be:  Signed.  Notarized.  Dated.  Copied.  Witnessed.  Added to your medical record.  You may also want to choose someone to handle your money if you can't do so. This is called a durable POA for finances. It's separate from a medical POA. You may choose your health care proxy or someone else to act as your agent in money matters.  If you don't have a proxy, or if the proxy may not be acting in your best interest, a court may choose a guardian to act on your behalf.  What is a living will?  A living will is legal paperwork that states your wishes about medical care. Providers should keep a copy of it in your medical record. You may want to give a copy to family members or friends. You can also keep a card in your wallet to let loved ones know you have a living will and where they can find it. A living will may be used if:  You're very  sick with something that will end your life.  You become disabled.  You can't make decisions or speak for yourself.  Your living will should include whether:  To use or not use life support equipment. This may include machines to filter your blood or to help you breathe.  You want a DNR or DNAR order. This tells providers not to use CPR if your heart or breathing stops.  To use or not use tube feeding.  You want to be given foods and fluids.  You want a type of comfort care called palliative care. This may be given when the goal for treatment becomes comfort rather than a cure.  You want to donate your organs and tissues.  A living will doesn't say what to do with your money and property if you pass away.  What is a DNR or DNAR?  A DNR or DNAR order is a request not to have CPR. If you don't have one of these orders, a provider will try to help you if your heart stops or you stop breathing.   If you plan to have surgery, talk with your provider about your DNR or DNAR order.  What happens if I don't have an advance directive?  Each state has its own laws about advance directives. Some states assign family decision makers to act on your behalf if you don't have an advance directive.   Check with your provider, , or state representative about the laws in your state.  Where to find more information  Each state has its own laws about advance directives. You can look up these laws at: [facility]org  This information is not intended to replace advice given to you by your health care provider. Make sure you discuss any questions you have with your health care provider.  Document Revised: 05/06/2024 Document Reviewed: 05/06/2024  Elsevier Patient Education © 2024 [employer]    Counseling/Anticipatory Guidance:   Plan of care reviewed with patient at the conclusion of today's visit. Education was provided in regards to diagnosis, diet and exercise, prostate cancer screening discussed including benefit of early  detection, potential need for follow-up, and harms associated with additional management. Patient agrees to screening.    Nutrition, physical activity, healthy weight,ways to reduce stress, adequate sleep, injury prevention, misuse of tobacco, alcohol and drugs, sexual behavior and STD's, dental health, mental health, and immunizations.    Plan of care reviewed with patient at the conclusion of today's visit. Education was provided regarding diagnosis, management and any prescribed or recommended OTC medications.  Patient verbalizes understanding of and agreement with management plan.      Return in about 1 year (around 1/13/2026) for ROUTINE PHYSICAL.    Maria Elena Chawla PA-C  Patient or patient representative verbalized consent for the use of Ambient Listening during the visit with  Maria Elena Chawla PA-C for chart documentation. 1/13/2025  10:01 EST

## 2025-01-13 NOTE — PROGRESS NOTES
"[facility], [place], operated by [facility]    Diego Lloyd  1985   [id number]      Patient Care Team:  Maria Elena Chawla PA-C as PCP - General (Physician Assistant)  Chelo Wen APRN Bunge, Robert J, MD as Consulting Physician (Psychiatry)    Chief Complaint::   Chief Complaint   Patient presents with    Annual Exam    ADHD          HPI  History of Present Illness           Patient Active Problem List   Diagnosis    Lipid screening    Elevated liver enzymes    Thyroid disorder screening    Encounter for vitamin deficiency screening    History of alcoholism    ADHD, predominantly inattentive type    Anxiety and depression    Primary insomnia    Hearing loss of left ear due to cerumen impaction    Routine medical exam        Past Medical History:   Diagnosis Date    Anxiety     Depression        No past surgical history on file.    Family History   Problem Relation Age of Onset    Hypertension Mother     Hypertension Father     Stroke Paternal Uncle     Cancer Maternal Grandmother     Cancer Paternal Grandfather        Social History     Socioeconomic History    Marital status:    Tobacco Use    Smoking status: Never    Smokeless tobacco: Never   Vaping Use    Vaping status: Never Used   Substance and Sexual Activity    Alcohol use: Not Currently     Comment: stopped 11/11/21    Drug use: Never    Sexual activity: Defer       No Known Allergies    Review of Systems       Vital Signs  Vitals:    01/13/25 0921   BP: 104/80   BP Location: Left arm   Patient Position: Sitting   Cuff Size: Adult   Temp: 97.7 °F (36.5 °C)   TempSrc: Temporal   Weight: 87.5 kg (192 lb 12.8 oz)   Height: 181 cm (71.26\")   PainSc: 0-No pain     Body mass index is 26.69 kg/m².  {BMI is >= 25 and <30. (Overweight) The following options were offered after discussion;:[id number]}     Advance Care Planning   {Advance Directive Status:70169}       Current Outpatient Medications:     busPIRone (BUSPAR) 15 MG tablet, Take 2 " tablets by mouth 2 (Two) Times a Day., Disp: 360 tablet, Rfl: 2    desvenlafaxine (PRISTIQ) 100 MG 24 hr tablet, Take 1 tablet by mouth Every Morning., Disp: 90 tablet, Rfl: 2    lamoTRIgine (LaMICtal) 100 MG tablet, Take 1 tablet by mouth Every Morning., Disp: 90 tablet, Rfl: 2    propranolol (INDERAL) 20 MG tablet, Take 1 tablet by mouth as needed for performance anxiety, Disp: 50 tablet, Rfl: 5    Physical Exam     ACE III MINI            Results Review:    No results found for this or any previous visit (from the past 4 weeks).  Procedures    Medication Review: Medications reviewed and noted    Social History     Socioeconomic History    Marital status:    Tobacco Use    Smoking status: Never    Smokeless tobacco: Never   Vaping Use    Vaping status: Never Used   Substance and Sexual Activity    Alcohol use: Not Currently     Comment: stopped 11/11/21    Drug use: Never    Sexual activity: Defer        Assessment/Plan:    There are no diagnoses linked to this encounter.     Assessment & Plan         There are no Patient Instructions on file for this visit.     Plan of care reviewed with patient at the conclusion of today's visit. Education was provided regarding diagnosis, management, and any prescribed or recommended OTC medications.Patient verbalizes understanding of and agreement with management plan.         {Time Spent (Optional):84947}    Paula Villareal MA    {[facility] [employer] Provider Statement:56140}

## 2025-02-03 ENCOUNTER — OFFICE VISIT (OUTPATIENT)
Age: 40
End: 2025-02-03
Payer: COMMERCIAL

## 2025-02-03 VITALS
HEIGHT: 71 IN | OXYGEN SATURATION: 97 % | WEIGHT: 190 LBS | HEART RATE: 79 BPM | BODY MASS INDEX: 26.6 KG/M2 | SYSTOLIC BLOOD PRESSURE: 120 MMHG | DIASTOLIC BLOOD PRESSURE: 82 MMHG

## 2025-02-03 DIAGNOSIS — F33.0 MDD (MAJOR DEPRESSIVE DISORDER), RECURRENT EPISODE, MILD: ICD-10-CM

## 2025-02-03 DIAGNOSIS — Z51.81 THERAPEUTIC DRUG MONITORING: ICD-10-CM

## 2025-02-03 DIAGNOSIS — F90.0 ADHD (ATTENTION DEFICIT HYPERACTIVITY DISORDER), INATTENTIVE TYPE: Primary | ICD-10-CM

## 2025-02-03 DIAGNOSIS — F41.1 GENERALIZED ANXIETY DISORDER: ICD-10-CM

## 2025-02-03 RX ORDER — LISDEXAMFETAMINE DIMESYLATE 20 MG/1
20 CAPSULE ORAL EVERY MORNING
Qty: 30 CAPSULE | Refills: 0 | Status: SHIPPED | OUTPATIENT
Start: 2025-02-03 | End: 2025-02-03

## 2025-02-03 RX ORDER — PROPRANOLOL HYDROCHLORIDE 10 MG/1
10 TABLET ORAL DAILY PRN
Qty: 60 TABLET | Refills: 1 | Status: SHIPPED | OUTPATIENT
Start: 2025-02-03 | End: 2025-02-03

## 2025-02-03 RX ORDER — PROPRANOLOL HYDROCHLORIDE 10 MG/1
10 TABLET ORAL DAILY PRN
Qty: 60 TABLET | Refills: 1 | Status: SHIPPED | OUTPATIENT
Start: 2025-02-03

## 2025-02-03 RX ORDER — LISDEXAMFETAMINE DIMESYLATE 20 MG/1
20 CAPSULE ORAL EVERY MORNING
Qty: 30 CAPSULE | Refills: 0 | Status: SHIPPED | OUTPATIENT
Start: 2025-02-03

## 2025-02-03 NOTE — PROGRESS NOTES
New Patient Office Visit      Date: 2025  Patient Name: Diego Lloyd  : 1985   MRN: [id number]     Referring Provider: Maria Elena Chawla PA-C    Chief Complaint:      ICD-10-CM ICD-9-CM   1. ADHD (attention deficit hyperactivity disorder), inattentive type  F90.0 314.00   2. Therapeutic drug monitoring  Z51.81 V58.83   3. MDD (major depressive disorder), recurrent episode, mild  F33.0 296.31   4. Generalized anxiety disorder  F41.1 300.02        History of Present Illness:   Diego Lloyd is a 39 y.o. male who is here today to establish care for continuous medication management. This is the patient's initial encounter with this provider.   History of Present Illness  The patient presents for evaluation of ADHD, anxiety, and depression.    He is currently on a regimen of Lamictal 100 mg, Pristiq 100 mg, BuSpar 15 mg twice daily, and propranolol as needed for anxiety. He rates his anxiety at 4 out of 10. Patient scored 6 on ANABELLA-7, indicating mild symptoms of anxiety. His symptoms include increased worry, stress, and irritability, particularly in response to minor frustrations. He experiences shakiness when extremely nervous. He denies mood swings, manic episodes, or decreased need for sleep. He occasionally overspends but does not experience financial strain. He experienced panic attacks in 2016 or 2017 after discontinuing Lexapro.    He has been diagnosed with depression. He continues to function during depressive episodes but experiences sadness and fatigue, which he attributes to work stress. He has always struggled with morning wakefulness but does not believe this is due to depression. He reports no feelings of guilt, worthlessness, or suicidal ideation. He denies HI/AVH. He rates his depressive symptoms as 7 out of 10 on particularly bad days and 4 out of 10 on average. Patient scored 8 on the PHQ-9, indicating mild symptoms of depression.     He was diagnosed with ADHD  in 2019 and was initially treated with Adderall. He discontinued this medication but resumed it last year. However, he stopped taking it again due to difficulties in contacting previous providers office, as the provider passed away, and subsequent medication shortages. He reports that his productivity at work improves to 90 percent when on medication. Without medication, he struggles with note-taking at work, often falling behind by an hour and spending additional time at the end of the day to catch up. He finds it challenging to focus on one task at a time and transitions between tasks or thoughts. He continues to procrastinate both at work and home, leaving tasks unfinished, which frustrates his wife. He believes his listening skills are good, although he occasionally misses details in conversation. He sometimes struggles with waiting his turn or interrupting others. He does not have difficulty enjoying leisure activities due to attention span issues or relaxing. He feels more rushed towards the end of the day and feels like he frequently hyperfixates on specific tasks. He has not undergone formal ADHD testing but has completed questionnaires and symptom checklists. He becomes restless and fidgety during long meetings and admits to being disorganized. He does not believe he is more impatient than others. He reports difficulty starting and completing tasks and reports he struggled with turning in assignments during high school.    The patient endorses symptoms of ADHD including, but not limited to: careless mistakes or not paying attention to directions or people of authority, trouble keeping attention on tasks and during hobbies or leisure activities, does not listen when spoken to directly, trouble organizing activities, avoids dislikes or doesn't want to do things that require mental effort for a long period of time, loses things needed for tasks, easily distracted, forgetful in daily activities, often fidgets  with hands or feet or squirms in seat, often is restless, often gets up from seat and moves around when remaining in seat is expected, often has trouble enjoying leisure activities quietly, and often interrupts or intrudes on others which have caused impairment in important areas of daily functioning.  The patient has had symptoms of ADHD since grade school, which have worsened over the last few years.      He reports history of alcohol abuse, last drink was 3 years ago Nov 2021. He reports drinking 1 pint per day and drink of choice was vodka.    Calos CPT3 Completed: Results indicates that he may have issues related to inattentiveness (strong indication) and impulsivity (strong indication).  4 a-typical T-scores which is associated with a high likelihood of having a disorder characterized by attention deficits  Patient brought in mental health notes from previous provider.    Supplemental Information  He is currently under the care of a counselor at [facility] in [place], with whom he consults every 4 to 6 weeks. His past psychiatric history includes diagnoses of depression, anxiety, and ADHD. Over the past 30 years, he has trialed various antidepressants, including Zoloft and Lexapro, with Pristiq proving to be the most effective. He has also been prescribed Adderall and Ritalin.    SOCIAL HISTORY  He works as a PT assistant at nursing homes.    Current Medications for MHI:    Lamictal 100 mg  Pristiq 100 mg  BuSpar 15 mg BID  Propranolol 20 mg as needed    Current Treatments/Therapy for MHI:   [facility]    Subjective      Review of Systems:     Denies seizure, focal weakness, changes in vision, paresthesia’s, or numbness, headache, and neck stiffness  Denies cough, sputum, wheezing, hemoptysis   Denies chest pain, palpitations, orthopnea   Denies abdominal pain, nausea, vomiting, diarrhea, and constipation   Denies dysuria, urgency, changes in frequency and hematuria   Denies fever  and chills   Denies skin rash, hair loss, and edema   Denies ecchymoses and bleeding   Denies heat or cold intolerance, polydipsia, and polyuria  Denies abnormal movements, tics, and tremors  Denies weight gain/loss    Depression Screening:  Patient screened positive for depression based on a PHQ-9 score of 8 on 2/3/2025. Follow-up recommendations include: PCP managing depression. Will take over prescribing psychotropic medications as needed.    PHQ-9 Depression Screening  Little interest or pleasure in doing things? Not at all   Feeling down, depressed, or hopeless? Not at all   PHQ-2 Total Score 0   Trouble falling or staying asleep, or sleeping too much? Several days   Feeling tired or having little energy? Over half   Poor appetite or overeating? Several days   Feeling bad about yourself - or that you are a failure or have let yourself or your family down? Several days   Trouble concentrating on things, such as reading the newspaper or watching television? Over half   Moving or speaking so slowly that other people could have noticed? Or the opposite - being so fidgety or restless that you have been moving around a lot more than usual? Several days   Thoughts that you would be better off dead, or of hurting yourself in some way? Not at all   PHQ-9 Total Score 8   If you checked off any problems, how difficult have these problems made it for you to do your work, take care of things at home, or get along with other people? Very difficult        ANABELLA-7      Over the last two weeks, how often have you been bothered by the following problems?  Feeling nervous, anxious or on edge: Several days  Not being able to stop or control worrying: Not at all  Worrying too much about different things: Not at all  Trouble Relaxing: Several days  Being so restless that it is hard to sit still: More than half the days  Becoming easily annoyed or irritable: More than half the days  Feeling afraid as if something awful might happen:  Not at all  ANABELLA 7 Total Score: 6  If you checked any problems, how difficult have these problems made it for you to do your work, take care of things at home, or get along with other people: Very difficult    SUICIDE RISK ASSESSMENT/CSSRS:  1. Does client have thoughts /of suicide? no  2. Does client have intent for suicide? no  3. Does client have a current plan for suicide? no  4. History of suicide attempts: no  5. Family history of suicide or attempts: no  6. History of violent behaviors towards others or property or thoughts of committing suicide: no  7. History of sexual aggression toward others: no  8. Access to firearms or weapons: no    Past Psychiatric History:   History of outpatient psychiatrist: yes  Diagnoses: MDD, ANABELLA, ADHD  History of outpatient therapy: yes  Previous Inpatient hospitalizations: no  Previous medication trials: Zoloft, Lexapro, Adderall, Ritalin  History of suicide/self harm attempts: no    Review of Psychiatric Systems:  Mood (depression, julia/hypomania): Depressive symptoms,  Psychosis/thought disturbances: Denies  Anxiety/panic: Increased anxiety and history of panic attacks  Obsessions and compulsions: Denies  Abuse (verbal/physical/sexual) /Trauma: Adult Denies and Childhood Denies  Dissociation: Denies  Somatic concerns: Denies  Appetite: increase  Sleep pattern: 7 hours of consistent sleep per night   Personality disorders: Denies    Abuse/trauma History:              Physical: no              Sexual: no              Emotional/Neglect: no              Significant death/loss: Denies              Other trauma: Denies              Head Injury/Seizures:no  Triggers: (Persons/Places/Things/Events/Thought/Emotions): Denies     Substance Abuse History/Last use:              Alcohol: history of abuse, last drink was 3 years ago Nov 2021, 1 pint per day, drink of choice was vodka              Tobacco/Vape: no               Illicit Drugs: no               Caffeine: yes Daily               Seizures:no    Legal History:   No legal history noted today.      Educational and Occupational History:               Highest level of education obtained: college- associates degree               History? no              Patient's Occupation: PT assistant- PRN     Interpersonal/Relational:              Marital Status:               Support system: good support system      Social History:  Where was [place] born[place]   Upbringing: Mother and Father  Where does patient currently live[place]   Living situation: lives with wife  Leisure and Recreation: Movies  Mormon: Anabaptist   Developmental history: All milestones met yes    Family History:   Family History   Problem Relation Age of Onset    Hypertension Mother     Hypertension Father     Stroke Paternal Uncle     Cancer Maternal Grandmother     Cancer Paternal Grandfather        Family Psychiatric History:   Psych Diagnosis: Denies  History of suicide/self harm attempts: Denies  History of Substance abuse: Denies    Past Medical History:   Past Medical History:   Diagnosis Date    ADHD (attention deficit hyperactivity disorder)     Anxiety     Bipolar disorder     Depression     Panic disorder        Past Surgical History:   History reviewed. No pertinent surgical history.    Medications:     Current Outpatient Medications:     busPIRone (BUSPAR) 15 MG tablet, Take 2 tablets by mouth 2 (Two) Times a Day., Disp: 360 tablet, Rfl: 2    desvenlafaxine (PRISTIQ) 100 MG 24 hr tablet, Take 1 tablet by mouth Every Morning., Disp: 90 tablet, Rfl: 2    lamoTRIgine (LaMICtal) 100 MG tablet, Take 1 tablet by mouth Every Morning., Disp: 90 tablet, Rfl: 2    lisdexamfetamine (Vyvanse) 20 MG capsule, Take 1 capsule by mouth Every Morning, Disp: 30 capsule, Rfl: 0    propranolol (INDERAL) 10 MG tablet, Take 1 tablet by mouth Daily As Needed (for anxiety). As needed, Disp: 60 tablet, Rfl: 1    Medication Considerations:  SHERIF reviewed and appropriate.   "    Herbals and supplements: Multi Vit     Allergies:   No Known Allergies    Objective     Physical Exam:  Vital Signs:   Vitals:    02/03/25 1348   BP: 120/82   Pulse: 79   SpO2: 97%   Weight: 86.2 kg (190 lb)   Height: 181 cm (71.26\")     Body mass index is 26.31 kg/m².     Mental Status Exam:   MENTAL STATUS EXAM   General Appearance:  Cleanly groomed and dressed and well developed  Eye Contact:  Good eye contact  Attitude:  Cooperative  Motor Activity:  Normal gait, posture and fidgety  Muscle Strength:  Normal  Speech:  Normal rate, tone, volume  Language:  Spontaneous  Mood and affect:  Normal, pleasant  Hopelessness:  Denies  Loneliness: Denies  Thought Process:  Logical  Associations/ Thought Content:  No delusions  Hallucinations:  None  Suicidal Ideations:  Not present  Homicidal Ideation:  Not present  Sensorium:  Alert and clear  Orientation:  Person, place, time and situation  Immediate Recall, Recent, and Remote Memory:  Intact  Attention Span/ Concentration:  Good  Fund of Knowledge:  Appropriate for age and educational level  Intellectual Functioning:  Average range  Insight:  Good  Judgement:  Fair  Reliability:  Good  Impulse Control:  Fair       @RESULASTCBCDIFFPANEL,TSH,LABLIPI,SEXCUFIL56,TTFPULAI65,MG,FOLATE,PROLACTIN,CRPRESULT,CMP,[id number])@    Lab Results   Component Value Date    GLUCOSE 71 01/13/2025    BUN 10 01/13/2025    CREATININE 0.86 01/13/2025    EGFR 113.0 01/13/2025    BCR 11.6 01/13/2025    K 4.4 01/13/2025    CO2 29.0 01/13/2025    CALCIUM 9.4 01/13/2025    ALBUMIN 4.5 01/13/2025    BILITOT 0.2 01/13/2025    AST 21 01/13/2025    ALT 22 01/13/2025       Lab Results   Component Value Date    WBC 6.86 01/13/2025    HGB 14.7 01/13/2025    HCT 42.6 01/13/2025    MCV 92.6 01/13/2025     01/13/2025       Lab Results   Component Value Date    CHOL 126 01/13/2025    TRIG 85 01/13/2025    HDL 43 01/13/2025    LDL 66 01/13/2025       The following data was reviewed by: Edwin " DERRICK Stern on 02/03/2025:         Assessment / Plan      Visit Diagnosis/Orders Placed This Visit:  Diagnoses and all orders for this visit:    1. ADHD (attention deficit hyperactivity disorder), inattentive type (Primary)  -     Discontinue: lisdexamfetamine (Vyvanse) 20 MG capsule; Take 1 capsule by mouth Every Morning  Dispense: 30 capsule; Refill: 0  -     lisdexamfetamine (Vyvanse) 20 MG capsule; Take 1 capsule by mouth Every Morning  Dispense: 30 capsule; Refill: 0  -     ECG 12 Lead    2. Therapeutic drug monitoring  -     ToxAssure Flex 23, Ur - Urine, Clean Catch; Future  -     ToxAssure Flex 23, Ur - Urine, Clean Catch    3. MDD (major depressive disorder), recurrent episode, mild    4. Generalized anxiety disorder    Other orders  -     Discontinue: propranolol (INDERAL) 10 MG tablet; Take 1 tablet by mouth Daily As Needed (for anxiety). As needed  Dispense: 60 tablet; Refill: 1  -     propranolol (INDERAL) 10 MG tablet; Take 1 tablet by mouth Daily As Needed (for anxiety). As needed  Dispense: 60 tablet; Refill: 1        Assessment & Plan  1. Attention deficit hyperactivity disorder (ADHD).  The patient reports significant difficulties with inattentiveness and impulsivity, particularly at work, where he struggles to maintain productivity and complete tasks efficiently. He has a history of being on Ritalin and Adderall but discontinued it due to difficulties with pharmacy availability and communication with his previous provider.    2. Anxiety.  The patient experiences anxiety characterized by worrying, stress, and occasional irritability. He is currently on Pristiq 100 mg and BuSpar 15 mg twice a day. He also uses propranolol as needed for situational anxiety, such as during presentations at work. He reports running out of propranolol and requests a refill.    3. Depression.  The patient has a history of depression and is currently on Pristiq 100 mg and Lamictal 100mg. He reports experiencing some  sadness and fatigue but remains functional. He does not have any suicidal ideation or severe depressive symptoms.      Prognosis: Good with Ongoing Treatment  Patient's diagnoses include ADHD, MDD, and ANABELLA. Unique factors influencing symptom alleviation/remission include: pre-existing conditions, symptom chronicity, symptom severity, degree of impairment, social support, financial security, motivation, patient engagement and medication adherence. Prognosis is largely dependent on patient's adherence to medication treatment plan, follow up appointments and willingness to engage in psychotherapy      Functional Status: Mild impairment     Impression/Formulation: Patient appeared alert and oriented. Patient's major concern for today's visit includes ADHD evaluation.      Treatment and medication options discussed during today's visit.  Opportunity provided for any necessary clarification and patient questions. Patient acknowledges and verbally consents to proceed with mutually agreed upon treatment plan. Patient is voluntarily requesting to begin outpatient psychiatric treatment at [facility] [address] Patient is receptive to assistance with maintaining a stable lifestyle. Patient presents with history of     ICD-10-CM ICD-9-CM   1. ADHD (attention deficit hyperactivity disorder), inattentive type  F90.0 314.00   2. Therapeutic drug monitoring  Z51.81 V58.83   3. MDD (major depressive disorder), recurrent episode, mild  F33.0 296.31   4. Generalized anxiety disorder  F41.1 300.02   .    Reviewed patient's previous provider notes. Reviewed most recent labs. Patient meets DSM V diagnostic criteria for diagnoses. Diagnoses may be updated as more information becomes available.       Differential diagnoses include:    Treatment Plan:     Continue Lamictal 100mg, Pristiq 100mg, and Buspar 15mg BID. No refills needed   Decreased Propranolol 10mg PO PRN. Refilled prescription   Initiate  Vyvanse 20mg PO qam    EKG completed for stimulant therapy   Calos CPT3 Completed   Encourage to continue psychotherapy   Follow-up in 4 weeks (per request) and as needed    Patient will pursue supportive psychotherapy efforts and medications as prescribed. Provider instructed patient to obtain psychiatric medication from this provider only to prevent polypharmacy and possible overprescribing or unsafe medication combinations. Clinic will obtain release of information for current treatment team for continuity of care as needed. Patient will contact this office, call 911 or present to the nearest emergency room should suicidal or homicidal ideations occur. Discussed medication options and treatment plan of prescribed medication(s) as well as the risks, benefits, and potential side effects. Patient acknowledged and verbally consented to continue with current treatment plan and was educated on the importance of compliance with treatment and follow-up appointments.      MEDICATION Treatment: Discussed medication treatment options and plan of prescribed medication. Potential risks, benefits, and side effects including but not limited to the following reviewed: Black Box warnings, worsening symptoms, SI, sedation, GI side effects, metabolic alterations and blood pressure fluctuations. Patient is reminded to refrain from illicit substance use, including alcohol and THC while taking medications. Also advised to refrain from activity requiring alertness until sedative effects of medication are assessed.     We discussed the  risk of  Chava Jose Syndrome with use of Lamictal that is most common in younger population and occurs within first few months of starting medication. Lamictal's only serious risk is SJS which can be fatal if left untreated. Slow titration reduces this risk from 1 in 100 to 1 in 2500. (Carlat 2021). Patient is encouraged to monitor skin for correlating rash, lesions in the mucous membranes, or  flu-like symptoms. Should any of these symptoms occur, patient is advised to stop medication immediatly and notify provider. Patient should avoid trying new hygiene products or changing laundry soaps at this time. Anytime medication is not taken for five consecutive days, patient must notify provider and the titration must be restarted at 25 mg to minimize risk of SJS. Patient verbalizes understanding.        Medication risks and side effects discussed with patient including risk for worsening mood, changes in behavior, thoughts of suicide or homicide, induction of julia, serotonin syndrome, rare hyponatremia, rare SIADH, withdrawal symptoms if abrupt withdrawal, sexual dysfunction.   If any thoughts of SI or HI, worsening mood or changes in behavior, call 911 or crisis line [phone], or go to nearest ER at once. Patient agrees to notify close family/friend of new trial of antidepressants/info above. Pt.verbalizes understanding and consents to treatment with this medication.     Medication options for treatment of ADHD discussed including stimulant and non-stimulant options. Extensive education is provided regarding risks associated with stimulant use including but not limited to:, insomnia, headache, exacerbation of tics, nervousness, irritability, overstimulation, tremor, dizziness, anorexia or change in appetite, nausea, dry mouth, constipation, diarrhea, weight loss, sexual dysfunction, psychotic episodes, seizures, palpitations, tachycardia, hypertension, rare activation (activation of hypomania, julia, and/or suicidal ideations), cardiovascular adverse effects including sudden death. Patient denies any personal or family history of seizures or structural cardiac abnormalities.     Controlled substance agreement reviewed and signed by patient, Patient  is  informed that the medication is to be used by the patient only, the medication is to be used only as directed, and the medication should not be combined with  other substances unless directed by a Provider/Prescriber. I advised patients to avoid taking  medication with alcohol or illicit/unprescribed substances., including THC. Patient understands that habitual use of THC while being prescribed a stimulant will result in provider discontinuing stimulant medication due to the cognition dulling effects of marijuana negating the cognitive enhancing action of the stimulant. The patient verbalizes understanding and agreement with this in their own words, and wishes to pursue proposed treatment plan.         Short-term goals: Patient will adhere to medication regimen and experience continued improvement in symptoms over the next 3 months.   Long-term goals: Patient will adhere to medication treatment plan and report improvement in symptoms over the next 6 months    Quality Measures:     TOBACCO USE:  Tobacco Use: Low Risk  (2/3/2025)    Patient History     Smoking Tobacco Use: Never     Smokeless Tobacco Use: Never     Passive Exposure: Not on file      Never smoker    I advised Diego of the risks of tobacco use.     Follow Up:   Return in about 4 weeks (around 3/3/2025).    Patient or patient representative verbalized consent for the use of Ambient Listening during the visit with  DERRICK Chew for chart documentation. 2/3/2025  21:06 EST    DERRICK Chew, PMP-[facility] [address]

## 2025-02-07 LAB
1OH-MIDAZOLAM UR QL SCN: NOT DETECTED NG/MG CREAT
6MAM UR QL SCN: NEGATIVE NG/ML
7AMINOCLONAZEPAM/CREAT UR: NOT DETECTED NG/MG CREAT
A-OH ALPRAZ/CREAT UR: NOT DETECTED NG/MG CREAT
A-OH-TRIAZOLAM/CREAT UR CFM: NOT DETECTED NG/MG CREAT
ACP UR QL CFM: NOT DETECTED
ALPRAZ/CREAT UR CFM: NOT DETECTED NG/MG CREAT
AMPHETAMINES UR QL SCN: NEGATIVE NG/ML
APAP UR QL SCN: NEGATIVE UG/ML
BARBITURATES UR QL SCN: NEGATIVE NG/ML
BENZODIAZ SCN METH UR: NEGATIVE
BUPRENORPHINE UR QL SCN: NEGATIVE
BUPRENORPHINE/CREAT UR: NOT DETECTED NG/MG CREAT
CANNABINOIDS UR QL SCN: NEGATIVE NG/ML
CARISOPRODOL UR QL: NEGATIVE NG/ML
CLONAZEPAM/CREAT UR CFM: NOT DETECTED NG/MG CREAT
COCAINE+BZE UR QL SCN: NEGATIVE NG/ML
CREAT UR-MCNC: 27 MG/DL
D-METHORPHAN UR-MCNC: NOT DETECTED NG/ML
D-METHORPHAN+LEVORPHANOL UR QL: NOT DETECTED
DESALKYLFLURAZ/CREAT UR: NOT DETECTED NG/MG CREAT
DIAZEPAM/CREAT UR: NOT DETECTED NG/MG CREAT
ETHANOL UR QL SCN: NEGATIVE G/DL
ETHANOL UR QL SCN: NEGATIVE NG/ML
FENTANYL CTO UR SCN-MCNC: NEGATIVE NG/ML
FENTANYL/CREAT UR: NOT DETECTED NG/MG CREAT
FLUNITRAZEPAM UR QL SCN: NOT DETECTED NG/MG CREAT
GABAPENTIN UR-MCNC: NEGATIVE UG/ML
HALLUCINOGENS UR: NEGATIVE
HYPNOTICS UR QL SCN: NEGATIVE
KETAMINE UR QL: NOT DETECTED
LORAZEPAM/CREAT UR: NOT DETECTED NG/MG CREAT
MEPERIDINE UR QL SCN: NEGATIVE NG/ML
METHADONE UR QL SCN: NEGATIVE NG/ML
METHADONE+METAB UR QL SCN: NEGATIVE NG/ML
MIDAZOLAM/CREAT UR CFM: NOT DETECTED NG/MG CREAT
MISCELLANEOUS, UR: NEGATIVE
NORBUPRENORPHINE/CREAT UR: NOT DETECTED NG/MG CREAT
NORDIAZEPAM/CREAT UR: NOT DETECTED NG/MG CREAT
NORFENTANYL/CREAT UR: NOT DETECTED NG/MG CREAT
NORFLUNITRAZEPAM UR-MCNC: NOT DETECTED NG/MG CREAT
NORKETAMINE UR-MCNC: NOT DETECTED UG/ML
OPIATES UR SCN-MCNC: NEGATIVE NG/ML
OXAZEPAM/CREAT UR: NOT DETECTED NG/MG CREAT
OXYCODONE CTO UR SCN-MCNC: NEGATIVE NG/ML
PCP UR QL SCN: NEGATIVE NG/ML
PRESCRIBED MEDICATIONS: NORMAL
PROPOXYPH UR QL SCN: NEGATIVE NG/ML
TAPENTADOL CTO UR SCN-MCNC: NEGATIVE NG/ML
TEMAZEPAM/CREAT UR: NOT DETECTED NG/MG CREAT
TRAMADOL UR QL SCN: NEGATIVE NG/ML
ZALEPLON UR-MCNC: NOT DETECTED NG/ML
ZOLPIDEM PHENYL-4-CARB UR QL SCN: NOT DETECTED
ZOLPIDEM UR QL SCN: NOT DETECTED
ZOPICLONE-N-OXIDE UR-MCNC: NOT DETECTED NG/ML

## 2025-03-03 ENCOUNTER — OFFICE VISIT (OUTPATIENT)
Age: 40
End: 2025-03-03
Payer: COMMERCIAL

## 2025-03-03 VITALS
OXYGEN SATURATION: 99 % | HEART RATE: 81 BPM | SYSTOLIC BLOOD PRESSURE: 120 MMHG | WEIGHT: 186 LBS | DIASTOLIC BLOOD PRESSURE: 80 MMHG | HEIGHT: 71 IN | BODY MASS INDEX: 26.04 KG/M2

## 2025-03-03 DIAGNOSIS — F41.1 GENERALIZED ANXIETY DISORDER: ICD-10-CM

## 2025-03-03 DIAGNOSIS — F10.11 HISTORY OF ALCOHOL ABUSE: ICD-10-CM

## 2025-03-03 DIAGNOSIS — F33.0 MDD (MAJOR DEPRESSIVE DISORDER), RECURRENT EPISODE, MILD: ICD-10-CM

## 2025-03-03 DIAGNOSIS — F90.0 ADHD (ATTENTION DEFICIT HYPERACTIVITY DISORDER), INATTENTIVE TYPE: Primary | ICD-10-CM

## 2025-03-03 RX ORDER — DEXTROAMPHETAMINE SACCHARATE, AMPHETAMINE ASPARTATE MONOHYDRATE, DEXTROAMPHETAMINE SULFATE AND AMPHETAMINE SULFATE 5; 5; 5; 5 MG/1; MG/1; MG/1; MG/1
20 CAPSULE, EXTENDED RELEASE ORAL DAILY
Qty: 30 CAPSULE | Refills: 0 | Status: SHIPPED | OUTPATIENT
Start: 2025-03-03 | End: 2026-03-03

## 2025-03-03 NOTE — PROGRESS NOTES
Follow Up Office Visit      Patient Name: Diego Lloyd  : 1985   MRN: [id number]     Referring Provider: Maria Elena Chawla PA-C    Chief Complaint:      ICD-10-CM ICD-9-CM   1. ADHD (attention deficit hyperactivity disorder), inattentive type  F90.0 314.00   2. MDD (major depressive disorder), recurrent episode, mild  F33.0 296.31   3. Generalized anxiety disorder  F41.1 300.02   4. History of alcohol abuse  F10.11 305.03        History of Present Illness:   Diego Lloyd is a 39 y.o. male who is here today for follow up and medication management.        Subjective      Patient Reports:   History of Present Illness  He reports a lack of significant improvement in his condition following the initiation of Vyvanse therapy. Initially, he experienced enhanced focus and concentration during the first few days of treatment. However, these benefits seemed to diminish post-lunch, leaving him with a sense of unease that he finds difficult to articulate. This unexpected outcome has led him to consider reverting to Adderall, a medication he had previously been on. He is currently in the process of relocating, which has been a source of stress, but he maintains that his overall condition remains stable. He reports no panic attacks or mood swings. 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 His sleep pattern is regular, averaging between 6 to 8 hours per night, with longer durations on weekends. He reports no changes in appetite or physical health. He also reports no suicidal or homicidal ideation or self-harm tendencies. He denies hallucinations. He rates his anxiety levels at 2 to 3 out of 10, and his low moments at 3 to 4 out of 10, noting that these tend to occur in the afternoons when the effects of his medication have waned. He does not endorse feelings of hopelessness or  loneliness.    MEDICATIONS  Current: Vyvanse  Past: Adderall    PHQ-9= 2 score from previous visit  ANABELLA-7= 0 score from previous visit    Review of Systems:   Review of Systems   Constitutional:  Negative for chills, diaphoresis, fatigue and fever.   HENT:  Negative for congestion and sore throat.    Respiratory:  Negative for cough.    Cardiovascular:  Negative for chest pain.   Gastrointestinal:  Negative for abdominal pain, nausea and vomiting.   Genitourinary:  Negative for dysuria.   Musculoskeletal:  Negative for myalgias and neck pain.   Skin:  Negative for rash.   Neurological:  Negative for weakness, numbness and headaches.   Psychiatric/Behavioral:  Positive for decreased concentration and dysphoric mood.    Sleep pattern: 6-8 hours per night   Appetite: normal     PHQ-9 Depression Screening  Little interest or pleasure in doing things? Not at all   Feeling down, depressed, or hopeless? Not at all   PHQ-2 Total Score 0   Trouble falling or staying asleep, or sleeping too much? Not at all   Feeling tired or having little energy? Not at all   Poor appetite or overeating? Not at all   Feeling bad about yourself - or that you are a failure or have let yourself or your family down? Not at all   Trouble concentrating on things, such as reading the newspaper or watching television? Over half   Moving or speaking so slowly that other people could have noticed? Or the opposite - being so fidgety or restless that you have been moving around a lot more than usual? Not at all   Thoughts that you would be better off dead, or of hurting yourself in some way? Not at all   PHQ-9 Total Score 2   If you checked off any problems, how difficult have these problems made it for you to do your work, take care of things at home, or get along with other people? Very difficult       ANABELLA-7 Anxiety Screening  Over the last two weeks, how often have you been bothered by the following problems?  Feeling nervous, anxious or on edge: Not  "at all  Not being able to stop or control worrying: Not at all  Worrying too much about different things: Not at all  Trouble Relaxing: Not at all  Being so restless that it is hard to sit still: Not at all  Becoming easily annoyed or irritable: Not at all  Feeling afraid as if something awful might happen: Not at all  ANABELLA 7 Total Score: 0  If you checked any problems, how difficult have these problems made it for you to do your work, take care of things at home, or get along with other people: Not difficult at all    RISK ASSESSMENT:  Patient denies any thoughts or intent of suicide today. Patient denies any impulsive behavior today.     Medications:     Current Outpatient Medications:     busPIRone (BUSPAR) 15 MG tablet, Take 2 tablets by mouth 2 (Two) Times a Day., Disp: 360 tablet, Rfl: 2    desvenlafaxine (PRISTIQ) 100 MG 24 hr tablet, Take 1 tablet by mouth Every Morning., Disp: 90 tablet, Rfl: 2    lamoTRIgine (LaMICtal) 100 MG tablet, Take 1 tablet by mouth Every Morning., Disp: 90 tablet, Rfl: 2    propranolol (INDERAL) 10 MG tablet, Take 1 tablet by mouth Daily As Needed (for anxiety). As needed, Disp: 60 tablet, Rfl: 1    amphetamine-dextroamphetamine XR (ADDERALL XR) 20 MG 24 hr capsule, Take 1 capsule by mouth Daily, Disp: 30 capsule, Rfl: 0    Medication Considerations:  SHERIF reviewed and appropriate.      Allergies:   No Known Allergies    Results Reviewed:   Yes     Objective     Physical Exam:  Vital Signs:   Vitals:    03/03/25 1500   BP: 120/80   Pulse: 81   SpO2: 99%   Weight: 84.4 kg (186 lb)   Height: 181 cm (71.26\")     Body mass index is 25.75 kg/m².     Mental Status Exam:   MENTAL STATUS EXAM   General Appearance:  Cleanly groomed and dressed and well developed  Eye Contact:  Good eye contact  Attitude:  Cooperative  Motor Activity:  Normal gait, posture and fidgety  Muscle Strength:  Normal  Speech:  Normal rate, tone, volume  Language:  Spontaneous  Mood and affect:  Normal, " pleasant  Hopelessness:  Denies  Loneliness: Denies  Thought Process:  Logical  Associations/ Thought Content:  No delusions  Hallucinations:  None  Suicidal Ideations:  Not present  Homicidal Ideation:  Not present  Sensorium:  Alert and clear  Orientation:  Person, place, time and situation  Immediate Recall, Recent, and Remote Memory:  Intact  Attention Span/ Concentration:  Good  Fund of Knowledge:  Appropriate for age and educational level  Intellectual Functioning:  Average range  Insight:  Good  Judgement:  Fair  Reliability:  Good  Impulse Control:  Fair       @RESULASTCBCDIFFPANEL,TSH,LABLIPI,[id number],UAQNUBMV99,MG,FOLATE,PROLACTIN,CRPRESULT,CMP,S1PPGTDARRV)@    Lab Results   Component Value Date    GLUCOSE 71 01/13/2025    BUN 10 01/13/2025    CREATININE 0.86 01/13/2025    EGFR 113.0 01/13/2025    BCR 11.6 01/13/2025    K 4.4 01/13/2025    CO2 29.0 01/13/2025    CALCIUM 9.4 01/13/2025    ALBUMIN 4.5 01/13/2025    BILITOT 0.2 01/13/2025    AST 21 01/13/2025    ALT 22 01/13/2025       Lab Results   Component Value Date    WBC 6.86 01/13/2025    HGB 14.7 01/13/2025    HCT 42.6 01/13/2025    MCV 92.6 01/13/2025     01/13/2025       Lab Results   Component Value Date    CHOL 126 01/13/2025    TRIG 85 01/13/2025    HDL 43 01/13/2025    LDL 66 01/13/2025       Assessment / Plan      Visit Diagnosis/Orders Placed This Visit:  Diagnoses and all orders for this visit:    1. ADHD (attention deficit hyperactivity disorder), inattentive type (Primary)  -     amphetamine-dextroamphetamine XR (ADDERALL XR) 20 MG 24 hr capsule; Take 1 capsule by mouth Daily  Dispense: 30 capsule; Refill: 0    2. MDD (major depressive disorder), recurrent episode, mild    3. Generalized anxiety disorder    4. History of alcohol abuse         Assessment & Plan  1. ADHD.  He reports that Vyvanse has not been significantly beneficial for his focus and concentration, especially in the afternoons.     2. Anxiety  He experiences stress  due to moving houses but no significant anxiety or panic attacks. He rates his anxious tendencies at 2-3/10 and his low moments at 3-4/10.       Functional Status: Mild impairment     Prognosis: Good with Ongoing Treatment     Impression/Formulation:  Patient appeared alert and oriented.  Patient is voluntarily requesting to continue outpatient psychiatric treatment at [facility] [address]  Patient is receptive to assistance with maintaining a stable lifestyle.  Patient presents with history of     ICD-10-CM ICD-9-CM   1. ADHD (attention deficit hyperactivity disorder), inattentive type  F90.0 314.00   2. MDD (major depressive disorder), recurrent episode, mild  F33.0 296.31   3. Generalized anxiety disorder  F41.1 300.02   4. History of alcohol abuse  F10.11 305.03   .    Reviewed patient's previous provider notes. Reviewed most recent labs. Patient meets DSM V diagnostic criteria for diagnoses. Diagnoses may be updated as more information becomes available.       Treatment Plan:   Continue Propranolol 10mg PO PRN, Lamictal 100mg, Pristiq 100mg, and Buspar 15mg BID. No refills needed  Discontinue Vyvanse   Initiate Adderall 20mg PO qam   Encouraged psychotherapy  Follow-up in 2 months and as needed    Patient will continue supportive psychotherapy efforts and medications as indicated. Clinic will obtain release of information for current treatment team for continuity of care as needed. Patient will contact this office, call 911 or present to the nearest emergency room should suicidal or homicidal ideations occur.  Discussed medication options and treatment plan of prescribed medication(s) as well as the risks, benefits, and potential side effects. Patient acknowledged and verbally consented to continue with current treatment plan and was educated on the importance of compliance with treatment and follow-up appointments.      We discussed the  risk of  Chava Jose Syndrome with  use of Lamictal that is most common in younger population and occurs within first few months of starting medication. Lamictal's only serious risk is SJS which can be fatal if left untreated. Slow titration reduces this risk from 1 in 100 to 1 in 2500. (Carlat 2021). Patient is encouraged to monitor skin for correlating rash, lesions in the mucous membranes, or flu-like symptoms. Should any of these symptoms occur, patient is advised to stop medication immediatly and notify provider. Patient should avoid trying new hygiene products or changing laundry soaps at this time. Anytime medication is not taken for five consecutive days, patient must notify provider and the titration must be restarted at 25 mg to minimize risk of SJS. Patient verbalizes understanding.       Medication options for treatment of ADHD discussed including stimulant and non-stimulant options. Extensive education is provided regarding risks associated with stimulant use including but not limited to:, insomnia, headache, exacerbation of tics, nervousness, irritability, overstimulation, tremor, dizziness, anorexia or change in appetite, nausea, dry mouth, constipation, diarrhea, weight loss, sexual dysfunction, psychotic episodes, seizures, palpitations, tachycardia, hypertension, rare activation (activation of hypomania, julia, and/or suicidal ideations), cardiovascular adverse effects including sudden death. Patient denies any personal or family history of seizures or structural cardiac abnormalities.     Controlled substance agreement reviewed and signed by patient, Patient  is  informed that the medication is to be used by the patient only, the medication is to be used only as directed, and the medication should not be combined with other substances unless directed by a Provider/Prescriber. I advised patients to avoid taking  medication with alcohol or illicit/unprescribed substances., including THC. Patient understands that habitual use of THC  while being prescribed a stimulant will result in provider discontinuing stimulant medication due to the cognition dulling effects of marijuana negating the cognitive enhancing action of the stimulant. The patient verbalizes understanding and agreement with this in their own words, and wishes to pursue proposed treatment plan.     Medication risks and side effects discussed with patient including risk for worsening mood, changes in behavior, thoughts of suicide or homicide, induction of julia, serotonin syndrome, rare hyponatremia, rare SIADH, withdrawal symptoms if abrupt withdrawal, sexual dysfunction.   If any thoughts of SI or HI, worsening mood or changes in behavior, call 911 or crisis line [phone], or go to nearest ER at once. Patient agrees to notify close family/friend of new trial of antidepressants/info above. Pt.verbalizes understanding and consents to treatment with this medication.    Quality Measures:   Never smoker    I advised Diego Brandon Lloyd of the risks of tobacco use.     Follow Up:   Return in about 2 months (around 5/3/2025) for tele.      Patient or patient representative verbalized consent for the use of Ambient Listening during the visit with  DERRICK Chew for chart documentation. 3/3/2025  15:24 EST    DERRICK Chew  [facility] [address]    Answers submitted by the patient for this visit:  Problem not listed (Submitted on 3/2/2025)  Chief Complaint: Other medical problem  Reason for appointment: Medication follow up  anorexia: No  joint pain: No  change in stool: No  joint swelling: No  swollen glands: No  vertigo: No  visual change: No

## 2025-04-01 DIAGNOSIS — F90.0 ADHD (ATTENTION DEFICIT HYPERACTIVITY DISORDER), INATTENTIVE TYPE: ICD-10-CM

## 2025-04-01 RX ORDER — DEXTROAMPHETAMINE SACCHARATE, AMPHETAMINE ASPARTATE MONOHYDRATE, DEXTROAMPHETAMINE SULFATE AND AMPHETAMINE SULFATE 5; 5; 5; 5 MG/1; MG/1; MG/1; MG/1
20 CAPSULE, EXTENDED RELEASE ORAL DAILY
Qty: 30 CAPSULE | Refills: 0 | Status: SHIPPED | OUTPATIENT
Start: 2025-04-01 | End: 2026-04-01

## 2025-04-01 NOTE — TELEPHONE ENCOUNTER
Rx Refill Note  Requested Prescriptions     Pending Prescriptions Disp Refills    amphetamine-dextroamphetamine XR (ADDERALL XR) 20 MG 24 hr capsule 30 capsule 0     Sig: Take 1 capsule by mouth Daily      Last office visit with prescribing clinician: 3/3/2025   Next office visit with prescribing clinician: 5/6/2025     LA: 03/03/25                        Would you like a call back once the refill request has been completed: [] Yes [] No    If the office needs to give you a call back, can they leave a voicemail: [] Yes [] No    Birdie Coe LPN  04/01/25, 10:36 EDT

## 2025-05-06 ENCOUNTER — TELEMEDICINE (OUTPATIENT)
Age: 40
End: 2025-05-06
Payer: COMMERCIAL

## 2025-05-06 DIAGNOSIS — F90.0 ADHD (ATTENTION DEFICIT HYPERACTIVITY DISORDER), INATTENTIVE TYPE: Primary | ICD-10-CM

## 2025-05-06 DIAGNOSIS — F33.0 MDD (MAJOR DEPRESSIVE DISORDER), RECURRENT EPISODE, MILD: ICD-10-CM

## 2025-05-06 DIAGNOSIS — F10.11 HISTORY OF ALCOHOL ABUSE: ICD-10-CM

## 2025-05-06 DIAGNOSIS — F41.1 GENERALIZED ANXIETY DISORDER: ICD-10-CM

## 2025-05-06 RX ORDER — DEXTROAMPHETAMINE SACCHARATE, AMPHETAMINE ASPARTATE MONOHYDRATE, DEXTROAMPHETAMINE SULFATE AND AMPHETAMINE SULFATE 6.25; 6.25; 6.25; 6.25 MG/1; MG/1; MG/1; MG/1
25 CAPSULE, EXTENDED RELEASE ORAL EVERY MORNING
Qty: 30 CAPSULE | Refills: 0 | Status: SHIPPED | OUTPATIENT
Start: 2025-05-06

## 2025-05-06 NOTE — PROGRESS NOTES
Follow Up Office Visit      Patient Name: Diego Lloyd  : 1985   MRN: [id number]     Referring Provider: Maria Elena Chawla PA-C    Chief Complaint:      ICD-10-CM ICD-9-CM   1. ADHD (attention deficit hyperactivity disorder), inattentive type  F90.0 314.00   2. MDD (major depressive disorder), recurrent episode, mild  F33.0 296.31   3. Generalized anxiety disorder  F41.1 300.02   4. History of alcohol abuse  F10.11 305.03        History of Present Illness:   Diego Lloyd is a 39 y.o. male who is here today for follow up and medication management.        Subjective      Patient Reports:   History of Present Illness  He reports a positive response to Adderall, noting an improvement in his work performance and a decrease in anxiety symptoms. He has observed an increase in productivity and heightened awareness of time management. He reports a decrease in racing thoughts. However, he continues to struggle with documentation tasks due to persistent background noise and distractions. He takes Adderall at approximately 6:00 to 6:30 AM, which provides significant benefit for the first 6 to 7 hours, with some residual effect lasting up to 12 hours.    His sleep pattern remains unaffected, and he reports no changes in appetite. He does not experience any panic attacks, anger outbursts, suicidal ideation, or self-harm tendencies. He also reports no changes in his physical health, feelings of hopelessness or loneliness, or cardiac side effects such as chest pain or palpitations.    He recently moved into a new house and reports the stress of moving and unpacking is starting to decrease.     MEDICATIONS  Current: Adderall  Past: Vyvanse      Review of Systems:   Review of Systems   Constitutional:  Negative for appetite change, chills, diaphoresis, fatigue, fever and unexpected weight change.   HENT:  Negative for congestion and sore throat.    Eyes:  Negative for visual disturbance.    Respiratory:  Negative for cough, chest tightness and shortness of breath.    Cardiovascular:  Negative for chest pain.   Gastrointestinal:  Negative for abdominal pain, nausea and vomiting.   Genitourinary:  Negative for dysuria.   Musculoskeletal:  Negative for gait problem, myalgias and neck pain.   Skin:  Negative for rash and wound.   Neurological:  Negative for dizziness, tremors, seizures, weakness, light-headedness, numbness and headaches.   Psychiatric/Behavioral:  Positive for decreased concentration. Negative for agitation, behavioral problems, confusion, dysphoric mood, hallucinations, self-injury, sleep disturbance and suicidal ideas. The patient is not nervous/anxious and is not hyperactive.    Sleep pattern: 6-8 hours per night   Appetite: normal     RISK ASSESSMENT:  Patient denies any thoughts or intent of suicide today. Patient denies any impulsive behavior today.     Medications:     Current Outpatient Medications:     amphetamine-dextroamphetamine XR (Adderall XR) 25 MG 24 hr capsule, Take 1 capsule by mouth Every Morning, Disp: 30 capsule, Rfl: 0    busPIRone (BUSPAR) 15 MG tablet, Take 2 tablets by mouth 2 (Two) Times a Day., Disp: 360 tablet, Rfl: 2    desvenlafaxine (PRISTIQ) 100 MG 24 hr tablet, Take 1 tablet by mouth Every Morning., Disp: 90 tablet, Rfl: 2    lamoTRIgine (LaMICtal) 100 MG tablet, Take 1 tablet by mouth Every Morning., Disp: 90 tablet, Rfl: 2    propranolol (INDERAL) 10 MG tablet, Take 1 tablet by mouth Daily As Needed (for anxiety). As needed, Disp: 60 tablet, Rfl: 1    Medication Considerations:  SHERIF reviewed and appropriate.      Allergies:   No Known Allergies    Results Reviewed:   Yes     Objective     Physical Exam:  Vital Signs: There were no vitals filed for this visit.  There is no height or weight on file to calculate BMI.   The patient was seen remotely today via a MyChart Video Visit through [facility].  Unable to obtain vital signs due to nature of remote  visit.  Height stated at 6 inches.  Weight stated at 185 pounds.     Mental Status Exam:   MENTAL STATUS EXAM   General Appearance:  Cleanly groomed and dressed and well developed  Eye Contact:  Good eye contact  Attitude:  Cooperative  Motor Activity:  Other  Other Comment:  L[facility] Video Visit  Muscle Strength:  Other  Other Comment:  L[facility] Video Visit  Speech:  Normal rate, tone, volume  Language:  Spontaneous  Mood and affect:  Normal, pleasant  Hopelessness:  Denies  Loneliness: Denies  Thought Process:  Logical  Associations/ Thought Content:  No delusions  Hallucinations:  None  Suicidal Ideations:  Not present  Homicidal Ideation:  Not present  Sensorium:  Alert and clear  Orientation:  Person, place, time and situation  Immediate Recall, Recent, and Remote Memory:  Intact  Attention Span/ Concentration:  Good  Fund of Knowledge:  Appropriate for age and educational level  Intellectual Functioning:  Average range  Insight:  Good  Judgement:  Fair  Reliability:  Good  Impulse Control:  Fair       @RESULASTCBCDIFFPANEL,TSH,LABLIPI,[id number],SEDPHOJE01,MG,FOLATE,PROLACTIN,CRPRESULT,CMP,[id number])@    Lab Results   Component Value Date    GLUCOSE 71 01/13/2025    BUN 10 01/13/2025    CREATININE 0.86 01/13/2025    EGFR 113.0 01/13/2025    BCR 11.6 01/13/2025    K 4.4 01/13/2025    CO2 29.0 01/13/2025    CALCIUM 9.4 01/13/2025    ALBUMIN 4.5 01/13/2025    BILITOT 0.2 01/13/2025    AST 21 01/13/2025    ALT 22 01/13/2025       Lab Results   Component Value Date    WBC 6.86 01/13/2025    HGB 14.7 01/13/2025    HCT 42.6 01/13/2025    MCV 92.6 01/13/2025     01/13/2025       Lab Results   Component Value Date    CHOL 126 01/13/2025    TRIG 85 01/13/2025    HDL 43 01/13/2025    LDL 66 01/13/2025       Assessment / Plan      Visit Diagnosis/Orders Placed This Visit:  Diagnoses and all orders for this visit:    1. ADHD (attention deficit hyperactivity disorder), inattentive type (Primary)  -      amphetamine-dextroamphetamine XR (Adderall XR) 25 MG 24 hr capsule; Take 1 capsule by mouth Every Morning  Dispense: 30 capsule; Refill: 0    2. MDD (major depressive disorder), recurrent episode, mild    3. Generalized anxiety disorder    4. History of alcohol abuse       Assessment & Plan  1. Attention Deficit Disorder (ADD).  He reports that Adderall has been helping with his productivity and anxiety, although he still experiences some trouble with ADD symptoms. He is currently on Adderall 20 mg, which he takes in the morning around 6-6:30 AM. The medication lasts for about 6-7 hours with some residual benefit for the next 5-6 hours. He has not experienced any panic attacks, anger outbursts, suicidal thoughts, or physical health changes. There are no cardiac side effects such as chest pain or palpitations. An increase in the dosage of Adderall was discussed to potentially extend its effectiveness throughout the day.       Functional Status: Mild impairment     Prognosis: Good with Ongoing Treatment     Impression/Formulation:  Patient appeared alert and oriented.  Patient is voluntarily requesting to continue outpatient psychiatric treatment at [facility] [address]  Patient is receptive to assistance with maintaining a stable lifestyle.  Patient presents with history of     ICD-10-CM ICD-9-CM   1. ADHD (attention deficit hyperactivity disorder), inattentive type  F90.0 314.00   2. MDD (major depressive disorder), recurrent episode, mild  F33.0 296.31   3. Generalized anxiety disorder  F41.1 300.02   4. History of alcohol abuse  F10.11 305.03   .    Reviewed patient's previous provider notes. Reviewed most recent labs. Patient meets DSM V diagnostic criteria for diagnoses. Diagnoses may be updated as more information becomes available.       Treatment Plan:   Continue Propranolol 10mg PO PRN, Lamictal 100mg, Pristiq 100mg, and Buspar 15mg BID. No refills needed  Increase Adderall  25mg PO qam   Encouraged psychotherapy  Follow-up in 2 months and as needed    Patient will continue supportive psychotherapy efforts and medications as indicated. Clinic will obtain release of information for current treatment team for continuity of care as needed. Patient will contact this office, call 911 or present to the nearest emergency room should suicidal or homicidal ideations occur.  Discussed medication options and treatment plan of prescribed medication(s) as well as the risks, benefits, and potential side effects. Patient acknowledged and verbally consented to continue with current treatment plan and was educated on the importance of compliance with treatment and follow-up appointments.     Medication risks and side effects discussed with patient including risk for worsening mood, changes in behavior, thoughts of suicide or homicide, induction of julia, serotonin syndrome, rare hyponatremia, rare SIADH, withdrawal symptoms if abrupt withdrawal, sexual dysfunction.   If any thoughts of SI or HI, worsening mood or changes in behavior, call 911 or crisis line [phone], or go to nearest ER at once. Patient agrees to notify close family/friend of new trial of antidepressants/info above. Pt.verbalizes understanding and consents to treatment with this medication.     We discussed the  risk of  Chava Jose Syndrome with use of Lamictal that is most common in younger population and occurs within first few months of starting medication. Lamictal's only serious risk is SJS which can be fatal if left untreated. Slow titration reduces this risk from 1 in 100 to 1 in 2500. (Carlat 2021). Patient is encouraged to monitor skin for correlating rash, lesions in the mucous membranes, or flu-like symptoms. Should any of these symptoms occur, patient is advised to stop medication immediatly and notify provider. Patient should avoid trying new hygiene products or changing laundry soaps at this time. Anytime medication is not  taken for five consecutive days, patient must notify provider and the titration must be restarted at 25 mg to minimize risk of SJS. Patient verbalizes understanding.       Medication options for treatment of ADHD discussed including stimulant and non-stimulant options. Extensive education is provided regarding risks associated with stimulant use including but not limited to:, insomnia, headache, exacerbation of tics, nervousness, irritability, overstimulation, tremor, dizziness, anorexia or change in appetite, nausea, dry mouth, constipation, diarrhea, weight loss, sexual dysfunction, psychotic episodes, seizures, palpitations, tachycardia, hypertension, rare activation (activation of hypomania, julia, and/or suicidal ideations), cardiovascular adverse effects including sudden death. Patient denies any personal or family history of seizures or structural cardiac abnormalities.     Controlled substance agreement reviewed and signed by patient, Patient  is  informed that the medication is to be used by the patient only, the medication is to be used only as directed, and the medication should not be combined with other substances unless directed by a Provider/Prescriber. I advised patients to avoid taking  medication with alcohol or illicit/unprescribed substances., including THC. Patient understands that habitual use of THC while being prescribed a stimulant will result in provider discontinuing stimulant medication due to the cognition dulling effects of marijuana negating the cognitive enhancing action of the stimulant. The patient verbalizes understanding and agreement with this in their own words, and wishes to pursue proposed treatment plan.           Quality Measures:   Never smoker    I advised Diego Lloyd of the risks of tobacco use.     Follow Up:   Return in about 2 months (around 7/6/2025).      Patient or patient representative verbalized consent for the use of Ambient Listening during the visit  with  DERRICK Chew for chart documentation. 5/6/2025  10:14 EDT    DERRICK Chew  [facility] [address]

## 2025-05-27 DIAGNOSIS — F41.1 GENERALIZED ANXIETY DISORDER: ICD-10-CM

## 2025-05-28 RX ORDER — PROPRANOLOL HYDROCHLORIDE 10 MG/1
TABLET ORAL
Qty: 90 TABLET | Refills: 0 | Status: SHIPPED | OUTPATIENT
Start: 2025-05-28

## 2025-06-04 DIAGNOSIS — F90.0 ADHD (ATTENTION DEFICIT HYPERACTIVITY DISORDER), INATTENTIVE TYPE: ICD-10-CM

## 2025-06-04 RX ORDER — DEXTROAMPHETAMINE SACCHARATE, AMPHETAMINE ASPARTATE MONOHYDRATE, DEXTROAMPHETAMINE SULFATE AND AMPHETAMINE SULFATE 6.25; 6.25; 6.25; 6.25 MG/1; MG/1; MG/1; MG/1
25 CAPSULE, EXTENDED RELEASE ORAL EVERY MORNING
Qty: 30 CAPSULE | Refills: 0 | Status: SHIPPED | OUTPATIENT
Start: 2025-06-04

## 2025-06-04 RX ORDER — DESVENLAFAXINE 100 MG/1
100 TABLET, EXTENDED RELEASE ORAL EVERY MORNING
Qty: 90 TABLET | Refills: 2 | Status: SHIPPED | OUTPATIENT
Start: 2025-06-04

## 2025-06-04 NOTE — TELEPHONE ENCOUNTER
Rx Refill Note  Requested Prescriptions     Pending Prescriptions Disp Refills    amphetamine-dextroamphetamine XR (Adderall XR) 25 MG 24 hr capsule 30 capsule 0     Sig: Take 1 capsule by mouth Every Morning      Last office visit with prescribing clinician: 3/3/2025   Last telemedicine visit with prescribing clinician: 5/6/2025   Next office visit with prescribing clinician: 7/8/2025     LA: 05/06/25 #30 0R                          Would you like a call back once the refill request has been completed: [] Yes [] No    If the office needs to give you a call back, can they leave a voicemail: [] Yes [] No    Birdie Coe LPN  06/04/25, 08:20 EDT

## 2025-06-04 NOTE — TELEPHONE ENCOUNTER
Rx Refill Note  Requested Prescriptions     Pending Prescriptions Disp Refills    desvenlafaxine (PRISTIQ) 100 MG 24 hr tablet 90 tablet 2     Sig: Take 1 tablet by mouth Every Morning.      Last office visit with prescribing clinician: 1/13/2025   Last telemedicine visit with prescribing clinician: Visit date not found   Next office visit with prescribing clinician: 1/16/2026                         Would you like a call back once the refill request has been completed: [] Yes [] No    If the office needs to give you a call back, can they leave a voicemail: [] Yes [] No    Thu Collier MA  06/04/25, 07:58 EDT

## 2025-07-07 ENCOUNTER — TELEPHONE (OUTPATIENT)
Dept: INTERNAL MEDICINE | Facility: CLINIC | Age: 40
End: 2025-07-07
Payer: COMMERCIAL

## 2025-07-07 DIAGNOSIS — F90.0 ADHD (ATTENTION DEFICIT HYPERACTIVITY DISORDER), INATTENTIVE TYPE: ICD-10-CM

## 2025-07-07 NOTE — TELEPHONE ENCOUNTER
PT CALLED REQUESTING REFILL ON ADDERALL. PT IS CLOSE TO BEING OUT. NEEDS TO BE SENT TO THE [facility] IN [place].

## 2025-07-08 RX ORDER — DEXTROAMPHETAMINE SACCHARATE, AMPHETAMINE ASPARTATE MONOHYDRATE, DEXTROAMPHETAMINE SULFATE AND AMPHETAMINE SULFATE 6.25; 6.25; 6.25; 6.25 MG/1; MG/1; MG/1; MG/1
25 CAPSULE, EXTENDED RELEASE ORAL EVERY MORNING
Qty: 30 CAPSULE | Refills: 0 | Status: SHIPPED | OUTPATIENT
Start: 2025-07-08

## 2025-07-15 ENCOUNTER — TELEMEDICINE (OUTPATIENT)
Age: 40
End: 2025-07-15
Payer: COMMERCIAL

## 2025-07-15 DIAGNOSIS — F33.0 MDD (MAJOR DEPRESSIVE DISORDER), RECURRENT EPISODE, MILD: ICD-10-CM

## 2025-07-15 DIAGNOSIS — F90.0 ADHD (ATTENTION DEFICIT HYPERACTIVITY DISORDER), INATTENTIVE TYPE: Primary | ICD-10-CM

## 2025-07-15 DIAGNOSIS — F10.11 HISTORY OF ALCOHOL ABUSE: ICD-10-CM

## 2025-07-15 DIAGNOSIS — F41.1 GENERALIZED ANXIETY DISORDER: ICD-10-CM

## 2025-07-15 NOTE — PROGRESS NOTES
Video Visit      Patient Name: Diego Lloyd  : 1985   MRN: [id number]     Referring Provider: Maria Elena Chawla PA-C    Chief Complaint:      ICD-10-CM ICD-9-CM   1. ADHD (attention deficit hyperactivity disorder), inattentive type  F90.0 314.00   2. MDD (major depressive disorder), recurrent episode, mild  F33.0 296.31   3. Generalized anxiety disorder  F41.1 300.02   4. History of alcohol abuse  F10.11 305.03          History of Present Illness:   Diego Lloyd is a 39 y.o. male who is being seen by a video visit today for follow up and medication management.           Subjective   Patient Reports:   History of Present Illness  The patient presents for evaluation of ADHD, anxiety and depression.    He reports a stable condition with no significant changes in his mental or physical health. His work performance and productivity have improved. He is currently on vacation and is planning to complete household projects from their recent move. He is sleeping well and has a good appetite. He does not experience feelings of hopelessness or loneliness. He reports no episodes of depression, suicidal ideation, self-harm, hallucinations, mood swings, anger outbursts, or panic attacks. He continues to take Lamictal, Pristiq, and BuSpar without any issues. His last consultation resulted in an increased dosage of Adderall, which he believes is managing his symptoms effectively. He rarely uses propranolol and does not require a refill at this time.    MEDICATIONS  Adderall, propranolol, Lamictal, Pristiq, BuSpar      Review of Systems:   Review of Systems   Constitutional:  Negative for appetite change, chills, diaphoresis, fatigue, fever and unexpected weight change.   HENT:  Negative for congestion and sore throat.    Eyes:  Negative for visual disturbance.   Respiratory:  Negative for cough, chest tightness and shortness of breath.    Cardiovascular:  Negative for chest pain.    Gastrointestinal:  Negative for abdominal pain, nausea and vomiting.   Genitourinary:  Negative for dysuria.   Musculoskeletal:  Negative for gait problem, myalgias and neck pain.   Skin:  Negative for rash and wound.   Neurological:  Negative for dizziness, tremors, seizures, weakness, light-headedness, numbness and headaches.   Psychiatric/Behavioral:  Negative for agitation, behavioral problems, confusion, decreased concentration, dysphoric mood, hallucinations, self-injury, sleep disturbance and suicidal ideas. The patient is not nervous/anxious and is not hyperactive.    Sleep pattern: 6-8 hours per night   Appetite: normal      RISK ASSESSMENT:  Patient denies any thoughts of suicide or intent today. Patient denies any suicidal or homicidal ideation today. Patient denies any high risk factors today.     Medications:     Current Outpatient Medications:     amphetamine-dextroamphetamine XR (Adderall XR) 25 MG 24 hr capsule, Take 1 capsule by mouth Every Morning, Disp: 30 capsule, Rfl: 0    busPIRone (BUSPAR) 15 MG tablet, Take 2 tablets by mouth 2 (Two) Times a Day., Disp: 360 tablet, Rfl: 2    desvenlafaxine (PRISTIQ) 100 MG 24 hr tablet, Take 1 tablet by mouth Every Morning., Disp: 90 tablet, Rfl: 2    lamoTRIgine (LaMICtal) 100 MG tablet, Take 1 tablet by mouth Every Morning., Disp: 90 tablet, Rfl: 2    propranolol (INDERAL) 10 MG tablet, TAKE 1 TABLET DAILY AS     NEEDED FOR ANXIETY, Disp: 90 tablet, Rfl: 0    Medication Considerations:  SHERIF reviewed and appropriate.      Allergies:   No Known Allergies    Objective     Physical Exam:  Vital Signs: There were no vitals filed for this visit.  There is no height or weight on file to calculate BMI.   The patient was seen remotely today via a MyChart Video Visit through [facility].  Unable to obtain vital signs due to nature of remote visit.  Height stated at 71.26 inches.  Weight stated at 185 pounds.     Mental Status Exam:   MENTAL STATUS EXAM   General  Appearance:  Cleanly groomed and dressed and well developed  Eye Contact:  Good eye contact  Attitude:  Cooperative and polite  Motor Activity:  Other  Other Comment:  L[facility] Video Visit  Muscle Strength:  Other  Other Comment:  L[facility] Video Visit  Speech:  Normal rate, tone, volume  Language:  Spontaneous  Mood and affect:  Normal, pleasant  Hopelessness:  Denies  Loneliness: Denies  Thought Process:  Logical  Associations/ Thought Content:  No delusions  Hallucinations:  None  Suicidal Ideations:  Not present  Homicidal Ideation:  Not present  Sensorium:  Alert and clear  Orientation:  Person, place, time and situation  Immediate Recall, Recent, and Remote Memory:  Intact  Attention Span/ Concentration:  Good  Fund of Knowledge:  Appropriate for age and educational level  Intellectual Functioning:  Average range  Insight:  Good  Judgement:  Fair  Reliability:  Good  Impulse Control:  Fair       @RESULASTCBCDIFFPANEL,TSH,LABLIPI,[id number],TAEGVLBQ93,MG,FOLATE,PROLACTIN,CRPRESULT,CMP,[id number])@    Lab Results   Component Value Date    GLUCOSE 71 01/13/2025    BUN 10 01/13/2025    CREATININE 0.86 01/13/2025    EGFR 113.0 01/13/2025    BCR 11.6 01/13/2025    K 4.4 01/13/2025    CO2 29.0 01/13/2025    CALCIUM 9.4 01/13/2025    ALBUMIN 4.5 01/13/2025    BILITOT 0.2 01/13/2025    AST 21 01/13/2025    ALT 22 01/13/2025       Lab Results   Component Value Date    WBC 6.86 01/13/2025    HGB 14.7 01/13/2025    HCT 42.6 01/13/2025    MCV 92.6 01/13/2025     01/13/2025       Lab Results   Component Value Date    CHOL 126 01/13/2025    TRIG 85 01/13/2025    HDL 43 01/13/2025    LDL 66 01/13/2025       Assessment / Plan      Visit Diagnosis/Orders Placed This Visit:  Diagnoses and all orders for this visit:    1. ADHD (attention deficit hyperactivity disorder), inattentive type (Primary)    2. MDD (major depressive disorder), recurrent episode, mild    3. Generalized anxiety disorder    4. History of alcohol abuse          Assessment & Plan  1. Anxiety.  He reports that his anxiety has been stable with no recent episodes of depression, mood swings, anger outbursts, or panic attacks. He is currently on Adderall, Lamictal, Pristiq, and BuSpar. He has not needed propranolol since previous visit. He is advised to continue his current medication regimen as it appears to be effective.    2. Depression.  He reports no symptoms of depression, including no thoughts of suicide, self-harm, or hallucinations. His sleep and appetite are stable, and he feels productive at work. He is advised to continue his current medications.      Functional Status: Mild impairment     Prognosis: Good with Ongoing Treatment     Impression/Formulation:  Patient appeared alert and oriented.  Patient is voluntarily requesting to continue outpatient psychiatric treatment at [facility].  Patient is receptive to assistance with maintaining a stable lifestyle.  Patient presents with history of     ICD-10-CM ICD-9-CM   1. ADHD (attention deficit hyperactivity disorder), inattentive type  F90.0 314.00   2. MDD (major depressive disorder), recurrent episode, mild  F33.0 296.31   3. Generalized anxiety disorder  F41.1 300.02   4. History of alcohol abuse  F10.11 305.03   . Reviewed patient's previous provider notes. Reviewed most recent labs. Patient meets DSM V diagnostic criteria for diagnoses. Diagnoses may be updated as more information becomes available.     Treatment Plan:   Continue Propranolol 10mg PO PRN, Lamictal 100mg, Pristiq 100mg, Buspar 15mg BID and Adderall 25mg PO qam   Encouraged psychotherapy  Follow up in 3 months and as needed    Patient will continue supportive psychotherapy efforts and medications as indicated. Clinic will obtain release of information for current treatment team for continuity of care as needed. Patient will contact this office, call 911 or present to the nearest emergency room should suicidal or  homicidal ideations occur. Discussed medication options and treatment plan of prescribed medication(s) as well as the risks, benefits, and potential side effects. Patient ackowledged and verbally consented to continue with current treatment plan and was educated on the importance of compliance with treatment and follow-up appointments.      We discussed the  risk of  Chava Jose Syndrome with use of Lamictal that is most common in younger population and occurs within first few months of starting medication. Lamictal's only serious risk is SJS which can be fatal if left untreated. Slow titration reduces this risk from 1 in 100 to 1 in 2500. (Carlat 2021). Patient is encouraged to monitor skin for correlating rash, lesions in the mucous membranes, or flu-like symptoms. Should any of these symptoms occur, patient is advised to stop medication immediatly and notify provider. Patient should avoid trying new hygiene products or changing laundry soaps at this time. Anytime medication is not taken for five consecutive days, patient must notify provider and the titration must be restarted at 25 mg to minimize risk of SJS. Patient verbalizes understanding.       Instructed will take 4-6 weeks for full therapeutic effect. Medication risks and side effects discussed with patient including risk for worsening mood, changes in behavior, thoughts of suicide or homicide, induction of julia, serotonin syndrome, rare hyponatremia, rare SIADH, withdrawal symptoms if abrupt withdrawal, sexual dysfunction.   If any thoughts of SI or HI, worsening mood or changes in behavior, call 911 or crisis line [phone], or go to nearest ER at once. Patient agrees to notify close family/friend of new trial of antidepressants/info above. Pt.verbalizes understanding and consents to treatment with this medication.    Medication options for treatment of ADHD discussed including stimulant and non-stimulant options. Extensive education is provided regarding  risks associated with stimulant use including but not limited to:, insomnia, headache, exacerbation of tics, nervousness, irritability, overstimulation, tremor, dizziness, anorexia or change in appetite, nausea, dry mouth, constipation, diarrhea, weight loss, sexual dysfunction, psychotic episodes, seizures, palpitations, tachycardia, hypertension, rare activation (activation of hypomania, julia, and/or suicidal ideations), cardiovascular adverse effects including sudden death. Patient denies any personal or family history of seizures or structural cardiac abnormalities.     Controlled substance agreement reviewed and signed by patient, Patient  is  informed that the medication is to be used by the patient only, the medication is to be used only as directed, and the medication should not be combined with other substances unless directed by a Provider/Prescriber. I advised patients to avoid taking  medication with alcohol or illicit/unprescribed substances., including THC. Patient understands that habitual use of THC while being prescribed a stimulant will result in provider discontinuing stimulant medication due to the cognition dulling effects of marijuana negating the cognitive enhancing action of the stimulant. The patient verbalizes understanding and agreement with this in their own words, and wishes to pursue proposed treatment plan.         Quality Measures:   Never smoker    I advised Diego Lloyd of the risks of tobacco use.       Follow Up:   Return in about 3 months (around 10/15/2025).    Patient or patient representative verbalized consent for the use of Ambient Listening during the visit with  DERRICK Chew for chart documentation. 7/15/2025  14:38 EDT    DERRICK Chew, [facility] [address]

## 2025-08-07 DIAGNOSIS — F90.0 ADHD (ATTENTION DEFICIT HYPERACTIVITY DISORDER), INATTENTIVE TYPE: ICD-10-CM

## 2025-08-07 RX ORDER — DEXTROAMPHETAMINE SACCHARATE, AMPHETAMINE ASPARTATE MONOHYDRATE, DEXTROAMPHETAMINE SULFATE AND AMPHETAMINE SULFATE 6.25; 6.25; 6.25; 6.25 MG/1; MG/1; MG/1; MG/1
25 CAPSULE, EXTENDED RELEASE ORAL EVERY MORNING
Qty: 30 CAPSULE | Refills: 0 | Status: SHIPPED | OUTPATIENT
Start: 2025-08-07

## 2025-08-08 DIAGNOSIS — F41.1 GENERALIZED ANXIETY DISORDER: ICD-10-CM

## 2025-08-11 RX ORDER — PROPRANOLOL HYDROCHLORIDE 10 MG/1
TABLET ORAL
Qty: 90 TABLET | Refills: 0 | Status: SHIPPED | OUTPATIENT
Start: 2025-08-11

## 2025-08-11 RX ORDER — LAMOTRIGINE 100 MG/1
100 TABLET ORAL EVERY MORNING
Qty: 90 TABLET | Refills: 2 | Status: SHIPPED | OUTPATIENT
Start: 2025-08-11